# Patient Record
Sex: MALE | Race: WHITE | Employment: FULL TIME | URBAN - METROPOLITAN AREA
[De-identification: names, ages, dates, MRNs, and addresses within clinical notes are randomized per-mention and may not be internally consistent; named-entity substitution may affect disease eponyms.]

---

## 2017-02-22 ENCOUNTER — TELEPHONE (OUTPATIENT)
Dept: SURGERY | Facility: CLINIC | Age: 22
End: 2017-02-22

## 2017-03-22 ENCOUNTER — OFFICE VISIT (OUTPATIENT)
Dept: SURGERY | Facility: CLINIC | Age: 22
End: 2017-03-22
Attending: NURSE PRACTITIONER
Payer: COMMERCIAL

## 2017-03-22 VITALS
HEART RATE: 89 BPM | WEIGHT: 227.07 LBS | HEIGHT: 70 IN | SYSTOLIC BLOOD PRESSURE: 124 MMHG | BODY MASS INDEX: 32.51 KG/M2 | DIASTOLIC BLOOD PRESSURE: 64 MMHG

## 2017-03-22 DIAGNOSIS — K59.00 CONSTIPATION, UNSPECIFIED CONSTIPATION TYPE: Primary | ICD-10-CM

## 2017-03-22 DIAGNOSIS — K94.19 ALTERED BOWEL ELIMINATION DUE TO INTESTINAL OSTOMY (H): ICD-10-CM

## 2017-03-22 PROCEDURE — 99212 OFFICE O/P EST SF 10 MIN: CPT | Mod: ZF

## 2017-03-22 ASSESSMENT — PAIN SCALES - GENERAL: PAINLEVEL: NO PAIN (0)

## 2017-03-22 NOTE — NURSING NOTE
"Chief Complaint   Patient presents with     RECHECK     tube change       Initial /64 (BP Location: Right arm, Patient Position: Chair, Cuff Size: Adult Large)  Pulse 89  Ht 5' 10.08\" (178 cm)  Wt 227 lb 1.2 oz (103 kg)  BMI 32.51 kg/m2 Estimated body mass index is 32.51 kg/(m^2) as calculated from the following:    Height as of this encounter: 5' 10.08\" (178 cm).    Weight as of this encounter: 227 lb 1.2 oz (103 kg).  Medication Reconciliation: complete     Panchito Stephens LPN      "

## 2017-03-22 NOTE — PROGRESS NOTES
2017              Jacob English MD     [facility]   [address] [address]       RE: Abram Fay     MRN: [id number]     : 1995        Dear Dr. English:      It was a pleasure to see your patient, Ramses Fay, at the Pediatric Surgery Clinic at the [facility] today.  As you recall, Ramses is a 21-year-old male with a diagnosis of autism who also had severe chronic constipation that led to overflow incontinence.  He had an appendicostomy created by Dr. Jimbo Panchal here at the [place] several years ago for administration of a daily antegrade colonic enema or ACE.  Today, he is seen in clinic accompanied by his mom for a 1-year annual followup for bowel management.      On review with Ramses and his mom, Ramses reports that he gives himself daily antegrade colonic enema on most days.  However, he does work some evening shifts, and some evenings, he finds it is just too late when he gets home from work to administer the enema.  He usually uses 500 mL of GoLYTELY for his daily antegrade colonic enema, except on days when he has skipped the evening before he will sometimes give himself about 700 mL of GoLYTELY.  This does produce a large bowel movement and he is continent in between.  He again this year is asking about whether or not he will need this tube for the rest of his life and we talked again about switching to an oral laxative and trying to wean off of the antegrade colonic enema.      On exam, Ramses has an obese abdomen.  It is soft and nontender.  His current appendicostomy tube which is an AMT Mini ONE G-tube button was removed and a new one 14-Greenlandic by 6.5 cm was placed.  Four mL of water was placed in the retention balloon.  I did talk to Ramses again about trying a daily dose of Ex-Lax and taking it in the morning and then trying to have a bowel movement in the evening before he does his ACE.  If he  does have a bowel movement, then he can skip his ACE for the evening. We will obtain a contrast enema to see what the size of his colon is and if that has come down to a more normal size after his use of appendicostomy for several years now.      Thank you, Dr. English, for allowing us to participate in the care of your patient.  It was a pleasure to see he and his mom in clinic today.  Please do not hesitate to contact our office if you have any questions regarding his ongoing bowel management.      Sincerely,      TAMY Rae, CNP   Pediatric Surgery   [facility]       Total time of visit was 25 minutes over half in counseling and education

## 2017-03-22 NOTE — MR AVS SNAPSHOT
After Visit Summary   3/22/2017    Abram Fay    MRN: [id number]           Patient Information     Date Of Birth          1995        Visit Information        Provider Department      3/22/2017 11:00 AM Sofya Morales APRN CNP Peds Surgery        Today's Diagnoses     Constipation, unspecified constipation type    -  1       Follow-ups after your visit        Your next 10 appointments already scheduled     Mar 29, 2017  2:00 PM CDT   XR COLON WATER SOLUBLE with URXR1   [facility] [place],  Radiology ([facility])    [address] [place] [address] [phone]           Your exam will take about one hour. If you think you might be pregnant, tell your doctor before your exam.  Your bowel (insides) must be empty to get a clear picture. Follow these guidelines:   The night before your exam:Take 10 ounces magnesium citrate at 6 p.m. the night before your exam.   Take 3 Dulcolax tablets at 8 p.m. Swallow the tablets whole, do not chew or crush them. Do not take within 1 hour of antacids.   1 Dulcolax rectal suppository. Take this the morning of your exam if your stools are not clear by this time. If your stools are clear, you don t need to take this. Do not use a suppository if you are having an air contrast colon.   Follow a  non-residue diet  for at least 48 hours. This means no fruits, vegetables, nuts, seeds or whole grains.   Do not eat, chew gum or smoke for 8 hours before your exam.   Keep drinking clear liquids until 2 hours before your exam. Clear liquids include water, clear juice, black coffee or clear tea without milk, Gatorade, clear soda.   Please bring a list of your current medicines to your exam. (Include vitamins, minerals and over-the-counter medicines.) Leave your valuables at home.  Please call the Imaging Department at your exam site with any questions.              Future tests that were ordered for you today  "    Open Future Orders        Priority Expected Expires Ordered    X-ray Colon water soluble Routine 3/22/2017 3/22/2018 3/22/2017            Who to contact     Please call your clinic at [phone] to:    Ask questions about your health    Make or cancel appointments    Discuss your medicines    Learn about your test results    Speak to your doctor   If you have compliments or concerns about an experience at your clinic, or if you wish to file a complaint, please contact [facility] Patient Relations at [phone] or email us at [email or web address]         Additional Information About Your Visit        [email or web address] Information     [employer] is an electronic gateway that provides easy, online access to your medical records. With Cerecor, you can request a clinic appointment, read your test results, renew a prescription or communicate with your care team.     To sign up for [employer] visit the website at [email or web address]   You will be asked to enter the access code listed below, as well as some personal information. Please follow the directions to create your username and password.     Your access code is: [email or web address]  Expires: 2017 12:05 PM     Your access code will  in 90 days. If you need help or a new code, please contact your [facility] or call [phone] for assistance.        Care EveryWhere ID     This is your Care EveryWhere ID. This could be used by other organizations to access your [facility] medical records  [id number]        Your Vitals Were     Pulse Height BMI (Body Mass Index)             89 5' 10.08\" (178 cm) 32.51 kg/m2          Blood Pressure from Last 3 Encounters:   17 124/64   16 108/62   11/11/15 107/73    Weight from Last 3 Encounters:   17 227 lb 1.2 oz (103 kg)   16 227 lb 6.4 oz (103.1 kg)   11/11/15 244 lb 11.4 oz (111 kg)               Primary Care Provider Office Phone # Fax " #    Jacob Engilsh [phone]       [facility] [address] [facility] [address]        Thank you!     Thank you for choosing PEDS SURGERY  for your care. Our goal is always to provide you with excellent care. Hearing back from our patients is one way we can continue to improve our services. Please take a few minutes to complete the written survey that you may receive in the mail after your visit with us. Thank you!             Your Updated Medication List - Protect others around you: Learn how to safely use, store and throw away your medicines at www.disposemymeds.org.          This list is accurate as of: 3/22/17 12:05 PM.  Always use your most recent med list.                   Brand Name Dispense Instructions for use    BENADRYL 25 MG tablet   Generic drug:  diphenhydrAMINE      2 tab at HS       GOLYTELY PO      Reported on 3/22/2017       melatonin 5 MG Caps      1 tab at HS       mupirocin 2 % ointment    BACTROBAN    22 g    Apply topically 3 times daily After foot soaks       order for DME     1 Device    AMT mini-one gastrostomy tube buttons

## 2017-03-22 NOTE — LETTER
3/22/2017      RE: Abram Fay  [address]  [facility] [address]       2017              Jacob English MD     [facility]   [address] [address]       RE: Abram aFy     MRN: [id number]     : 1995        Dear Dr. English:      It was a pleasure to see your patient, Ramses Fay, at the Pediatric Surgery Clinic at the [facility] today.  As you recall, Ramses is a 21-year-old male with a diagnosis of autism who also had severe chronic constipation that led to overflow incontinence.  He had an appendicostomy created by Dr. Jimbo Panchal here at the [facility] several years ago for administration of a daily antegrade colonic enema or ACE.  Today, he is seen in clinic accompanied by his mom for a 1-year annual followup for bowel management.      On review with Ramses and his mom, Ramses reports that he gives himself daily antegrade colonic enema on most days.  However, he does work some evening shifts, and some evenings, he finds it is just too late when he gets home from work to administer the enema.  He usually uses 500 mL of GoLYTELY for his daily antegrade colonic enema, except on days when he has skipped the evening before he will sometimes give himself about 700 mL of GoLYTELY.  This does produce a large bowel movement and he is continent in between.  He again this year is asking about whether or not he will need this tube for the rest of his life and we talked again about switching to an oral laxative and trying to wean off of the antegrade colonic enema.      On exam, Ramses has an obese abdomen.  It is soft and nontender.  His current appendicostomy tube which is an AMT Mini ONE G-tube button was removed and a new one 14-Danish by 6.5 cm was placed.  Four mL of water was placed in the retention balloon.  I did talk to Ramses again about trying a daily dose of Ex-Lax and taking it in the  morning and then trying to have a bowel movement in the evening before he does his ACE.  If he does have a bowel movement, then he can skip his ACE for the evening. We will obtain a contrast enema to see what the size of his colon is and if that has come down to a more normal size after his use of appendicostomy for several years now.      Thank you, Dr. English, for allowing us to participate in the care of your patient.  It was a pleasure to see he and his mom in clinic today.  Please do not hesitate to contact our office if you have any questions regarding his ongoing bowel management.      Sincerely,      TAMY Rae, CNP   Pediatric Surgery   [facility]       Total time of visit was 25 minutes over half in counseling and education

## 2017-03-29 ENCOUNTER — HOSPITAL ENCOUNTER (OUTPATIENT)
Dept: GENERAL RADIOLOGY | Facility: CLINIC | Age: 22
Discharge: HOME | End: 2017-03-29
Attending: NURSE PRACTITIONER | Admitting: NURSE PRACTITIONER
Payer: COMMERCIAL

## 2017-03-29 DIAGNOSIS — K59.00 CONSTIPATION, UNSPECIFIED CONSTIPATION TYPE: ICD-10-CM

## 2017-03-29 PROCEDURE — 74283 THER NMA RDCTJ INTUS/OBSTRCJ: CPT

## 2017-03-29 PROCEDURE — 25500064 ZZH RX 255 OP 636: Performed by: NURSE PRACTITIONER

## 2017-03-29 RX ADMIN — DIATRIZOATE MEGLUMINE 1450 ML: 180 INJECTION, SOLUTION INTRAVESICAL at 14:56

## 2017-03-30 ENCOUNTER — TELEPHONE (OUTPATIENT)
Dept: SURGERY | Facility: CLINIC | Age: 22
End: 2017-03-30

## 2017-03-30 NOTE — TELEPHONE ENCOUNTER
PEDS SURGERY  [facility]  [address]  [address]  [facility] [address]  [phone]  Dept: [phone]  __________________________________________________________________________________  Patient: Abram Pearce Nya     : 1995  Encounter: 3/30/17    MRN: [id number]    Commmunication  Abram Pearsonomids  MRN:  [id number]  :  1995  Telephone Number Contacted: [phone] (home)  Ordering Physician: blake  Date of Communication:  3/30/2017  Time of Communication:  9:16 AM  Communication Method: Voice Message  Person receiving communication:  Parent  Follow up instructions: call with questions  left VM message    Results: good progress, continue with Daily ACE via appendicostomy, continue oral senna if attempting to start weaning off of ACE

## 2017-04-10 ENCOUNTER — TELEPHONE (OUTPATIENT)
Dept: SURGERY | Facility: CLINIC | Age: 22
End: 2017-04-10

## 2017-04-10 NOTE — TELEPHONE ENCOUNTER
PEDS SURGERY  [facility]  [address]  [address]  [facility] [address]  [phone]  Dept: [phone]  __________________________________________________________________________________  Patient: Abram Pearce Rigos     : 1995  Encounter: 4/10/17    MRN: [id number]    Commmunication  Abram Fay  MRN:  [id number]  :  1995  Telephone Number Contacted: [phone] (home)  Ordering Physician: blake  Date of Communication:  4/10/2017  Time of Communication:  9:48 AM  Communication Method: Phone  Person receiving communication:  mother  Follow up instructions: start 1 square of ex lax daily. If stools on own can skip ACE, if no spontaneous bowel movement do usual ACE.   Patient understood results and recommendations.     Results: improved size of colon

## 2017-11-18 ENCOUNTER — OFFICE VISIT (OUTPATIENT)
Dept: URGENT CARE | Facility: URGENT CARE | Age: 22
End: 2017-11-18
Payer: COMMERCIAL

## 2017-11-18 VITALS
HEART RATE: 80 BPM | BODY MASS INDEX: 35.36 KG/M2 | DIASTOLIC BLOOD PRESSURE: 84 MMHG | SYSTOLIC BLOOD PRESSURE: 124 MMHG | WEIGHT: 247 LBS | RESPIRATION RATE: 20 BRPM | TEMPERATURE: 98.7 F

## 2017-11-18 DIAGNOSIS — J06.9 VIRAL UPPER RESPIRATORY TRACT INFECTION: Primary | ICD-10-CM

## 2017-11-18 DIAGNOSIS — H65.192 ACUTE MUCOID OTITIS MEDIA OF LEFT EAR: ICD-10-CM

## 2017-11-18 PROCEDURE — 99213 OFFICE O/P EST LOW 20 MIN: CPT | Performed by: PHYSICIAN ASSISTANT

## 2017-11-18 RX ORDER — AMOXICILLIN 500 MG/1
500 CAPSULE ORAL 3 TIMES DAILY
Qty: 30 CAPSULE | Refills: 0 | Status: SHIPPED | OUTPATIENT
Start: 2017-11-18 | End: 2019-05-23

## 2017-11-18 RX ORDER — PSEUDOEPHEDRINE HCL 120 MG/1
120 TABLET, FILM COATED, EXTENDED RELEASE ORAL EVERY 12 HOURS
Qty: 20 TABLET | Refills: 0 | Status: SHIPPED | OUTPATIENT
Start: 2017-11-18 | End: 2019-07-11

## 2017-11-18 NOTE — NURSING NOTE
"Chief Complaint   Patient presents with     Ear Problem     lt ear pain today,has had cold sx       Initial /84 (BP Location: Right arm, Patient Position: Chair, Cuff Size: Adult Regular)  Pulse 80  Temp 98.7  F (37.1  C) (Oral)  Resp 20  Wt 247 lb (112 kg)  BMI 35.36 kg/m2 Estimated body mass index is 35.36 kg/(m^2) as calculated from the following:    Height as of 3/22/17: 5' 10.08\" (1.78 m).    Weight as of this encounter: 247 lb (112 kg).  Medication Reconciliation: complete   Elvira MITCHELL    "

## 2017-11-18 NOTE — PROGRESS NOTES
SUBJECTIVE:   Abram Fay is a 22 year old male presenting with a chief complaint of ear pain left.   Onset of symptoms was 3 day(s) ago.  Course of illness is worsening.    Severity moderate  Current and Associated symptoms: cough - non-productive and cough - productive  Treatment measures tried include Tylenol/Ibuprofen, Decongestants and OTC Cough med.  Predisposing factors include ear infections as a child.    Past Medical History:   Diagnosis Date     Autistic disorder, current or active state      Constipation      Encopresis(307.7)      Muscle tone, decreased      Current Outpatient Prescriptions   Medication Sig Dispense Refill     polyethylene glycol (GOLYTELY) 236 G suspension Administer 500-700 ml via appendicostomy daily 51369 mL 11     BENADRYL 25 MG OR TABS 2 tab at HS       mupirocin (BACTROBAN) 2 % ointment Apply topically 3 times daily After foot soaks (Patient not taking: Reported on 3/22/2017) 22 g 1     ORDER FOR DME AMT mini-one gastrostomy tube buttons (Patient not taking: Reported on 3/22/2017) 1 Device 2     PEG 3350-KCl-NaBcb-NaCl-NaSulf (GOLYTELY PO) Reported on 3/22/2017       MELATONIN 5 MG OR CAPS 1 tab at HS       Social History   Substance Use Topics     Smoking status: Never Smoker     Smokeless tobacco: Never Used     Alcohol use No       ROS:  CONSTITUTIONAL:NEGATIVE for fever, chills, change in weight  RESP:cough-non productive and cough-productive    OBJECTIVE:  /84 (BP Location: Right arm, Patient Position: Chair, Cuff Size: Adult Regular)  Pulse 80  Temp 98.7  F (37.1  C) (Oral)  Resp 20  Wt 247 lb (112 kg)  BMI 35.36 kg/m2  GENERAL APPEARANCE: healthy, alert and no distress  EYES: EOMI,  PERRL, conjunctiva clear  HENT: TM erythematous left  NECK: supple, nontender, no lymphadenopathy  RESP: lungs clear to auscultation - no rales, rhonchi or wheezes  CV: regular rates and rhythm, normal S1 S2, no murmur noted  ABDOMEN:  soft, nontender, no HSM or masses and  bowel sounds normal  NEURO: Normal strength and tone, sensory exam grossly normal,  normal speech and mentation  SKIN: no suspicious lesions or rashes    ASSESSMENT:    1. Viral upper respiratory tract infection    - pseudoePHEDrine (SUDAFED) 120 MG 12 hr tablet; Take 1 tablet (120 mg) by mouth every 12 hours  Dispense: 20 tablet; Refill: 0    2. Acute mucoid otitis media of left ear    - pseudoePHEDrine (SUDAFED) 120 MG 12 hr tablet; Take 1 tablet (120 mg) by mouth every 12 hours  Dispense: 20 tablet; Refill: 0  - amoxicillin (AMOXIL) 500 MG capsule; Take 1 capsule (500 mg) by mouth 3 times daily  Dispense: 30 capsule; Refill: 0    PLAN:  OTC cough suppressant/expectorant and OTC decongestant/antihistamine. Follow up in primary clinic if not improving over the next 3 days.   See orders in Epic

## 2017-11-18 NOTE — LETTER
[facility]  [address] [address]  Phone: [phone]    November 18, 2017        Abram Fay  [address]  [facility] [address]          To whom it may concern:    RE: Abram Fay    Patient was seen and treated today at our clinic.    Please contact me for questions or concerns.      Sincerely,        Prashanth Jackman PA-C

## 2017-11-18 NOTE — MR AVS SNAPSHOT
"              After Visit Summary   2017    Abram Fay    MRN: [id number]           Patient Information     Date Of Birth          1995        Visit Information        Provider Department      2017 4:20 PM Prashanth Jackman PA-C [facility]        Today's Diagnoses     Viral upper respiratory tract infection    -  1    Acute mucoid otitis media of left ear           Follow-ups after your visit        Who to contact     If you have questions or need follow up information about today's clinic visit or your schedule please contact [facility] directly at [phone].  Normal or non-critical lab and imaging results will be communicated to you by STAT-Diagnosticahart, letter or phone within 4 business days after the clinic has received the results. If you do not hear from us within 7 days, please contact the clinic through STAT-Diagnosticahart or phone. If you have a critical or abnormal lab result, we will notify you by phone as soon as possible.  Submit refill requests through [employer] or call your pharmacy and they will forward the refill request to us. Please allow 3 business days for your refill to be completed.          Additional Information About Your Visit        MyChart Information     [employer] lets you send messages to your doctor, view your test results, renew your prescriptions, schedule appointments and more. To sign up, go to [email or web address] . Click on \"Log in\" on the left side of the screen, which will take you to the Welcome page. Then click on \"Sign up Now\" on the right side of the page.     You will be asked to enter the access code listed below, as well as some personal information. Please follow the directions to create your username and password.     Your access code is: [email or web address]  Expires: 2018  4:59 PM     Your access code will  in 90 days. If you need help or a new code, please call your [facility] clinic or [phone].   "      Care EveryWhere ID     This is your Care EveryWhere ID. This could be used by other organizations to access your [facility] medical records  [id number]        Your Vitals Were     Pulse Temperature Respirations BMI (Body Mass Index)          80 98.7  F (37.1  C) (Oral) 20 35.36 kg/m2         Blood Pressure from Last 3 Encounters:   11/18/17 124/84   03/22/17 124/64   12/12/16 108/62    Weight from Last 3 Encounters:   11/18/17 247 lb (112 kg)   03/22/17 227 lb 1.2 oz (103 kg)   12/12/16 227 lb 6.4 oz (103.1 kg)              Today, you had the following     No orders found for display         Today's Medication Changes          These changes are accurate as of: 11/18/17  4:59 PM.  If you have any questions, ask your nurse or doctor.               Start taking these medicines.        Dose/Directions    amoxicillin 500 MG capsule   Commonly known as:  AMOXIL   Used for:  Acute mucoid otitis media of left ear   Started by:  Prashanth Jackman PA-C        Dose:  500 mg   Take 1 capsule (500 mg) by mouth 3 times daily   Quantity:  30 capsule   Refills:  0       pseudoePHEDrine 120 MG 12 hr tablet   Commonly known as:  SUDAFED   Used for:  Viral upper respiratory tract infection, Acute mucoid otitis media of left ear   Started by:  Prashanth Jackman PA-C        Dose:  120 mg   Take 1 tablet (120 mg) by mouth every 12 hours   Quantity:  20 tablet   Refills:  0            Where to get your medicines      These medications were sent to [facility] [address] [address]     Phone:  [phone]     amoxicillin 500 MG capsule         Some of these will need a paper prescription and others can be bought over the counter.  Ask your nurse if you have questions.     Bring a paper prescription for each of these medications     pseudoePHEDrine 120 MG 12 hr tablet                Primary Care Provider Office Phone # Fax #    Jacob English [phone]  [phone]       [address] [facility] [address]        Equal Access to Services     NIKOS HAYES : Hadii aad ku hadanishao Sorachelali, waaxda luqadaha, qaybta kaalmada adethelma, jann ramos laHavenverito brady. So [facility] [phone].    ATENCIÓN: Si habla español, tiene a hernández disposición servicios gratuitos de asistencia lingüística. Llame al [phone].    We comply with applicable federal civil rights laws and [place] laws. We do not discriminate on the basis of race, color, national origin, age, disability, sex, sexual orientation, or gender identity.            Thank you!     Thank you for choosing [facility]  for your care. Our goal is always to provide you with excellent care. Hearing back from our patients is one way we can continue to improve our services. Please take a few minutes to complete the written survey that you may receive in the mail after your visit with us. Thank you!             Your Updated Medication List - Protect others around you: Learn how to safely use, store and throw away your medicines at www.disposemymeds.org.          This list is accurate as of: 11/18/17  4:59 PM.  Always use your most recent med list.                   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)       melatonin 5 MG Caps      1 tab at HS        mupirocin 2 % ointment    BACTROBAN    22 g    Apply topically 3 times daily After foot soaks    Ingrowing nail, left great toe, Ingrowing nail, right great toe       order for  DME     1 Device    AMT mini-one gastrostomy tube buttons    Constipation       pseudoePHEDrine 120 MG 12 hr tablet    SUDAFED    20 tablet    Take 1 tablet (120 mg) by mouth every 12 hours    Viral upper respiratory tract infection, Acute mucoid otitis media of left ear       * Notice:  This list has 2 medication(s) that are the same as other medications prescribed for you. Read the directions carefully, and ask your doctor or other care provider to review them with you.

## 2018-09-27 ENCOUNTER — HOSPITAL ENCOUNTER (OUTPATIENT)
Dept: GENERAL RADIOLOGY | Facility: CLINIC | Age: 23
Discharge: HOME | End: 2018-09-27
Attending: NURSE PRACTITIONER | Admitting: NURSE PRACTITIONER
Payer: COMMERCIAL

## 2018-09-27 ENCOUNTER — OFFICE VISIT (OUTPATIENT)
Dept: SURGERY | Facility: CLINIC | Age: 23
End: 2018-09-27
Attending: NURSE PRACTITIONER
Payer: COMMERCIAL

## 2018-09-27 VITALS
BODY MASS INDEX: 35.38 KG/M2 | HEART RATE: 91 BPM | WEIGHT: 247.14 LBS | HEIGHT: 70 IN | DIASTOLIC BLOOD PRESSURE: 70 MMHG | SYSTOLIC BLOOD PRESSURE: 127 MMHG

## 2018-09-27 DIAGNOSIS — K94.19 ALTERED BOWEL ELIMINATION DUE TO INTESTINAL OSTOMY (H): ICD-10-CM

## 2018-09-27 DIAGNOSIS — K59.00 CONSTIPATION, UNSPECIFIED CONSTIPATION TYPE: ICD-10-CM

## 2018-09-27 DIAGNOSIS — K59.00 CONSTIPATION, UNSPECIFIED CONSTIPATION TYPE: Primary | ICD-10-CM

## 2018-09-27 PROCEDURE — 74018 RADEX ABDOMEN 1 VIEW: CPT

## 2018-09-27 PROCEDURE — G0463 HOSPITAL OUTPT CLINIC VISIT: HCPCS | Mod: ZF

## 2018-09-27 ASSESSMENT — PAIN SCALES - GENERAL: PAINLEVEL: NO PAIN (0)

## 2018-09-27 NOTE — LETTER
2018      RE: Abram Fay  [address] [place] [address]       2018         Jacob English MD   [address] [place] [address]       PATIENT:   Abram Fay   :  1995   MRN #:  [id number]       Dear Dr. English:      It was a pleasure to see your patient, Abram Fay, in the Pediatric Surgery Clinic at the [facility] today.  As you recall, Ramses is a now 23-year-old male with a history of autism and severe chronic constipation that was nonresponsive to medical therapy.  He underwent an appendicostomy for administration of antegrade colonic enemas several years ago and has been doing enemas since that time.      Ramses is seen in clinic today accompanied by his mom for a routine annual followup.      On review with Ramses and his mother, he is still using his appendicostomy to administer antegrade colonic enemas.  He states he does this almost every day, it kind of depends on his work schedule.  The most he goes without using it is 3 days.  When he does use it to give an antegrade enema, he uses 600-700 mL of GoLYTELY.  He reports that this takes about 6-7 minutes to infuse and then he almost immediately has a bowel movement.  In addition to the antegrade colonic enemas, he is taking Dulcolax stool softener 2 tablets every day and Ex-Lax 1 square per day.  Sometimes if he feels like he is becoming constipated, he will take 2 Ex-Lax squares a day.  He also states that every once in a while he takes a fiber gummy.      Ramses states that there are days when he has a bowel movement without doing his antegrade colonic enema and he is wondering how he can work to wean himself off of his enemas.      I did discuss a regimen with Ramses to try and wean off his ACE.  I have instructed him to take 2 Dulcolax tablets a day and 2 Ex-Lax squares daily.  He should do this every day and then he does not need to do a flush  unless he gets to a third day without having a bowel movement.  At that time, he should do an antegrade colonic enema of 1000 mL of GoLYTELY.  I did send Ramses for an x-ray today so we can just assess where he is and he did have quite a bit of stool in his ascending colon.  Before we embark on the every third day appendicostomy/I will have him do 3 days in a row of 1 liter of GoLYTELY to see if we can get him completely cleaned out before we try to decrease the frequency of his ACE.      On exam, Ramses has an obese abdomen that is soft and nontender.  He has a 14 Khmer 6.5 cm AMT Mini ONE button in place that was removed and a new one was placed and 4 mL of saline was placed in the retention balloon.  I did discuss with Ramses trying to increase his activity to possibly lose some weight or at least not gain any more weight as we are reaching the maximum length of tubes that we have available.  If this tube does become too short for him, we have a couple of other options.  One is to use a straight KYRA-Key G-tube that can accommodate up to a 10 cm tract.  The other option is to do a daily intermittent catheterization of the stoma; however, Ramses did not appear to be too interested in that.      Thank you, Dr. English, for allowing us to participate in the care of your patient, Ramses.  It was a pleasure to see him and his mom in clinic today.  Please do not hesitate to contact our office at [phone] if you have any questions regarding the ongoing management of his appendicostomy and antegrade colonic enemas.      Sincerely,             TAMY Rae, CNP   Pediatric Surgery   [facility]       Total time of visit was 15 minutes, greater than 50% in counseling and education      TAMY RUSH CNP

## 2018-09-27 NOTE — PROGRESS NOTES
2018         Jacob English MD   [address] [address]       PATIENT:   Abram Fay   :  1995   MRN #:  [id number]       Dear Dr. English:      It was a pleasure to see your patient, Abram Fay, in the Pediatric Surgery Clinic at the [facility] today.  As you recall, Ramses is a now 23-year-old male with a history of autism and severe chronic constipation that was nonresponsive to medical therapy.  He underwent an appendicostomy for administration of antegrade colonic enemas several years ago and has been doing enemas since that time.      Ramses is seen in clinic today accompanied by his mom for a routine annual followup.      On review with Ramses and his mother, he is still using his appendicostomy to administer antegrade colonic enemas.  He states he does this almost every day, it kind of depends on his work schedule.  The most he goes without using it is 3 days.  When he does use it to give an antegrade enema, he uses 600-700 mL of GoLYTELY.  He reports that this takes about 6-7 minutes to infuse and then he almost immediately has a bowel movement.  In addition to the antegrade colonic enemas, he is taking Dulcolax stool softener 2 tablets every day and Ex-Lax 1 square per day.  Sometimes if he feels like he is becoming constipated, he will take 2 Ex-Lax squares a day.  He also states that every once in a while he takes a fiber gummy.      Ramses states that there are days when he has a bowel movement without doing his antegrade colonic enema and he is wondering how he can work to wean himself off of his enemas.      I did discuss a regimen with Ramses to try and wean off his ACE.  I have instructed him to take 2 Dulcolax tablets a day and 2 Ex-Lax squares daily.  He should do this every day and then he does not need to do a flush unless he gets to a third day without having a bowel movement.  At that time, he should  do an antegrade colonic enema of 1000 mL of GoLYTELY.  I did send Ramses for an x-ray today so we can just assess where he is and he did have quite a bit of stool in his ascending colon.  Before we embark on the every third day appendicostomy/I will have him do 3 days in a row of 1 liter of GoLYTELY to see if we can get him completely cleaned out before we try to decrease the frequency of his ACE.      On exam, Ramses has an obese abdomen that is soft and nontender.  He has a 14 [place] 6.5 cm AMT Mini ONE button in place that was removed and a new one was placed and 4 mL of saline was placed in the retention balloon.  I did discuss with Ramses trying to increase his activity to possibly lose some weight or at least not gain any more weight as we are reaching the maximum length of tubes that we have available.  If this tube does become too short for him, we have a couple of other options.  One is to use a straight KYRA-Key G-tube that can accommodate up to a 10 cm tract.  The other option is to do a daily intermittent catheterization of the stoma; however, Ramses did not appear to be too interested in that.      Thank you, Dr. English, for allowing us to participate in the care of your patient, Ramses.  It was a pleasure to see him and his mom in clinic today.  Please do not hesitate to contact our office at [phone] if you have any questions regarding the ongoing management of his appendicostomy and antegrade colonic enemas.      Sincerely,             TAMY Rae, CNP   Pediatric Surgery   [facility]       Total time of visit was 15 minutes, greater than 50% in counseling and education

## 2018-09-27 NOTE — PATIENT INSTRUCTIONS
Take 2 Dulcolax tablets per day.  Take 2 Ex lax squares per day.   If you have not had a bowel movement in 3 days, give yourself an ACE flush of 1000 ml Golytely.

## 2018-09-27 NOTE — MR AVS SNAPSHOT
After Visit Summary   2018    Abram Fay    MRN: [id number]           Patient Information     Date Of Birth          1995        Visit Information        Provider Department      2018 1:00 PM Sofya Morales APRN CNP Peds Surgery        Today's Diagnoses     Constipation, unspecified constipation type    -  1      Care Instructions    Take 2 Dulcolax tablets per day.  Take 2 Ex lax squares per day.   If you have not had a bowel movement in 3 days, give yourself an ACE flush of 1000 ml Golytely.           Follow-ups after your visit        Future tests that were ordered for you today     Open Future Orders        Priority Expected Expires Ordered    X-ray Abdomen 1 vw Routine 2018            Who to contact     Please call your clinic at [phone] to:    Ask questions about your health    Make or cancel appointments    Discuss your medicines    Learn about your test results    Speak to your doctor            Additional Information About Your Visit        MyChart Information     [employer] is an electronic gateway that provides easy, online access to your medical records. With [employer], you can request a clinic appointment, read your test results, renew a prescription or communicate with your care team.     To sign up for [employer] visit the website at [email or web address]   You will be asked to enter the access code listed below, as well as some personal information. Please follow the directions to create your username and password.     Your access code is: [email or web address]  Expires: 2018  1:31 PM     Your access code will  in 90 days. If you need help or a new code, please contact your [facility] or call [phone] for assistance.        Care EveryWhere ID     This is your Care EveryWhere ID. This could be used by other organizations to access your [place] medical records  [id number]        Your Vitals  "Were     Pulse Height BMI (Body Mass Index)             91 5' 10.28\" (178.5 cm) 35.18 kg/m2          Blood Pressure from Last 3 Encounters:   09/27/18 127/70   11/18/17 124/84   03/22/17 124/64    Weight from Last 3 Encounters:   09/27/18 247 lb 2.2 oz (112.1 kg)   11/18/17 247 lb (112 kg)   03/22/17 227 lb 1.2 oz (103 kg)               Primary Care Provider Office Phone # Fax #    Jacob English [phone]       [address] [facility] [address]        Equal Access to Services     NIKOS HAYES : Segundo Rolon, jim fernandez, roly chingalmada kade, jann brady. So [facility] [place] [facility] [phone].    ATENCIÓN: Si habla español, tiene a hernández disposición servicios gratuitos de asistencia lingüística. Llame al [phone].    We comply with applicable federal civil rights laws and [place] laws. We do not discriminate on the basis of race, color, national origin, age, disability, sex, sexual orientation, or gender identity.            Thank you!     Thank you for choosing PEDS SURGERY  for your care. Our goal is always to provide you with excellent care. Hearing back from our patients is one way we can continue to improve our services. Please take a few minutes to complete the written survey that you may receive in the mail after your visit with us. Thank you!             Your Updated Medication List - Protect others around you: Learn how to safely use, store and throw away your medicines at www.disposemymeds.org.          This list is accurate as of 9/27/18  1:31 PM.  Always use your most recent med list.                   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)       melatonin 5 MG Caps      1 tab at HS        mupirocin 2 % ointment    BACTROBAN    22 g    Apply topically 3 times daily After foot soaks    Ingrowing nail, left great toe, Ingrowing nail, right great toe       order for DME     1 Device    AMT mini-one gastrostomy tube buttons    Constipation       pseudoePHEDrine 120 MG 12 hr tablet    SUDAFED    20 tablet    Take 1 tablet (120 mg) by mouth every 12 hours    Viral upper respiratory tract infection, Acute mucoid otitis media of left ear       * Notice:  This list has 2 medication(s) that are the same as other medications prescribed for you. Read the directions carefully, and ask your doctor or other care provider to review them with you.

## 2018-09-27 NOTE — NURSING NOTE
"[facility] [[id number]]  Chief Complaint   Patient presents with     RECHECK     appendicostomy tube change     Initial /70  Pulse 91  Ht 5' 10.28\" (178.5 cm)  Wt 247 lb 2.2 oz (112.1 kg)  BMI 35.18 kg/m2 Estimated body mass index is 35.18 kg/(m^2) as calculated from the following:    Height as of this encounter: 5' 10.28\" (178.5 cm).    Weight as of this encounter: 247 lb 2.2 oz (112.1 kg).  Medication Reconciliation: complete     Charlotte Antunez      "

## 2019-04-25 ENCOUNTER — OFFICE VISIT (OUTPATIENT)
Dept: SURGERY | Facility: CLINIC | Age: 24
End: 2019-04-25
Attending: NURSE PRACTITIONER
Payer: COMMERCIAL

## 2019-04-25 VITALS — WEIGHT: 243.17 LBS | HEIGHT: 70 IN | BODY MASS INDEX: 34.81 KG/M2

## 2019-04-25 DIAGNOSIS — B37.2 CANDIDAL DERMATITIS: Primary | ICD-10-CM

## 2019-04-25 PROCEDURE — G0463 HOSPITAL OUTPT CLINIC VISIT: HCPCS | Mod: ZF

## 2019-04-25 RX ORDER — NYSTATIN 100000 U/G
OINTMENT TOPICAL 2 TIMES DAILY
Qty: 15 G | Refills: 0 | Status: SHIPPED | OUTPATIENT
Start: 2019-04-25 | End: 2021-02-24

## 2019-04-25 ASSESSMENT — PAIN SCALES - GENERAL: PAINLEVEL: NO PAIN (0)

## 2019-04-25 ASSESSMENT — MIFFLIN-ST. JEOR: SCORE: 2105.5

## 2019-04-25 NOTE — LETTER
2019      RE: Abram Fay  [address]  [facility] [address]       2019      Magda Garza DO   [address] [place] [address]       RE: Abram Fay   MRN:  [id number]   : 1995      Dear Dr. Garza:       It was a pleasure to see your patient, Ramses Fay, at the Pediatric Surgery Clinic at the [facility].  As you recall, Ramses is a 24-year-old male with a history of autism spectrum disorder who suffered from severe constipation that was recalcitrant to medical therapy.  He, in the distant past now, underwent an appendicostomy placement for the administration of antegrade colonic enemas. Ramses is seen in clinic today accompanied by his mother for routine bowel management followup.      On review with Ramses, he is using his appendicostomy tube on an almost daily basis to administer flush consisting of GoLYTELY.  He reports that when he does this, he does have an immediate bowel movement afterwards.  He also continues to take 2 Ex-Lax chocolate squares each day.  He reports that on some days, not very often, he does have a spontaneous bowel movement before he does his appendicostomy flush and on those days he will skip his antegrade enema.  On abdominal exam, he has an appendicostomy tube in his umbilicus. It is an AMT MiniONE G-tube button. He has reported some leaking around the site and his belly button is red and irritated with satellite red dot lesions indicative of a yeast infection.      Ramses is doing very well on the current regimen.  We did discuss him being able to skip the ACE if he does have a spontaneous bowel movement.  I have talked to him in the past about potentially weaning off using the appendicostomy for flushes.  However, he does not seem ready to do that yet.  I did prescribe Nystatin ointment for his umbilicus and I did replace his appendicostomy tube with a new one today.  Hopefully, with a functioning balloon  to hold this tube in, the leakage at the site will stop and that yeast infection will clear up.       Thank you, Dr. Garza, for allowing us to participate in the care of your patient, Ramses.  It was a pleasure to see him and his mom in clinic today.  Please do not hesitate to contact our office at [phone] if you have any questions regarding the ongoing management of his appendicostomy or antegrade colonic enemas.      Sincerely,      TAMY Rae, CNP   Pediatric Surgery   [facility]

## 2019-04-25 NOTE — NURSING NOTE
"[facility] [[id number]]  Chief Complaint   Patient presents with     RECHECK     follow up tube change     Initial Ht 5' 10.39\" (178.8 cm)   Wt 243 lb 2.7 oz (110.3 kg)   BMI 34.50 kg/m   Estimated body mass index is 34.5 kg/m  as calculated from the following:    Height as of this encounter: 5' 10.39\" (178.8 cm).    Weight as of this encounter: 243 lb 2.7 oz (110.3 kg).  Medication Reconciliation: complete  "

## 2019-04-25 NOTE — PROGRESS NOTES
2019      Magda Garza DO   [address] [place] [address]       RE: Abram Fay   MRN:  [id number]   : 1995      Dear Dr. Garza:       It was a pleasure to see your patient, Ramses Fay, at the Pediatric Surgery Clinic at the [facility].  As you recall, Ramses is a 24-year-old male with a history of autism spectrum disorder who suffered from severe constipation that was recalcitrant to medical therapy.  He, in the distant past now, underwent an appendicostomy placement for the administration of antegrade colonic enemas. Ramses is seen in clinic today accompanied by his mother for routine bowel management followup.      On review with Ramses, he is using his appendicostomy tube on an almost daily basis to administer flush consisting of GoLYTELY.  He reports that when he does this, he does have an immediate bowel movement afterwards.  He also continues to take 2 Ex-Lax chocolate squares each day.  He reports that on some days, not very often, he does have a spontaneous bowel movement before he does his appendicostomy flush and on those days he will skip his antegrade enema.  On abdominal exam, he has an appendicostomy tube in his umbilicus. It is an AMT MiniONE G-tube button. He has reported some leaking around the site and his belly button is red and irritated with satellite red dot lesions indicative of a yeast infection.      Ramses is doing very well on the current regimen.  We did discuss him being able to skip the ACE if he does have a spontaneous bowel movement.  I have talked to him in the past about potentially weaning off using the appendicostomy for flushes.  However, he does not seem ready to do that yet.  I did prescribe Nystatin ointment for his umbilicus and I did replace his appendicostomy tube with a new one today.  Hopefully, with a functioning balloon to hold this tube in, the leakage at the site will stop and that yeast infection  will clear up.       Thank you, Dr. Garza, for allowing us to participate in the care of your patient, Ramses.  It was a pleasure to see him and his mom in clinic today.  Please do not hesitate to contact our office at [phone] if you have any questions regarding the ongoing management of his appendicostomy or antegrade colonic enemas.      Sincerely,      TAMY Rae, CNP   Pediatric Surgery   [facility]

## 2019-05-23 ENCOUNTER — OFFICE VISIT (OUTPATIENT)
Dept: ALLERGY | Facility: CLINIC | Age: 24
End: 2019-05-23
Payer: COMMERCIAL

## 2019-05-23 VITALS
TEMPERATURE: 97.6 F | HEART RATE: 84 BPM | BODY MASS INDEX: 34.62 KG/M2 | WEIGHT: 241.84 LBS | SYSTOLIC BLOOD PRESSURE: 129 MMHG | HEIGHT: 70 IN | OXYGEN SATURATION: 99 % | RESPIRATION RATE: 16 BRPM | DIASTOLIC BLOOD PRESSURE: 73 MMHG

## 2019-05-23 DIAGNOSIS — L85.8 KERATOSIS PILARIS: ICD-10-CM

## 2019-05-23 DIAGNOSIS — J30.0 CHRONIC VASOMOTOR RHINITIS: Primary | ICD-10-CM

## 2019-05-23 PROCEDURE — 99000 SPECIMEN HANDLING OFFICE-LAB: CPT | Performed by: ALLERGY & IMMUNOLOGY

## 2019-05-23 PROCEDURE — 86003 ALLG SPEC IGE CRUDE XTRC EA: CPT | Mod: 90 | Performed by: ALLERGY & IMMUNOLOGY

## 2019-05-23 PROCEDURE — 82785 ASSAY OF IGE: CPT | Performed by: ALLERGY & IMMUNOLOGY

## 2019-05-23 PROCEDURE — 36415 COLL VENOUS BLD VENIPUNCTURE: CPT | Performed by: ALLERGY & IMMUNOLOGY

## 2019-05-23 PROCEDURE — 99204 OFFICE O/P NEW MOD 45 MIN: CPT | Performed by: ALLERGY & IMMUNOLOGY

## 2019-05-23 PROCEDURE — 86003 ALLG SPEC IGE CRUDE XTRC EA: CPT | Mod: 59 | Performed by: ALLERGY & IMMUNOLOGY

## 2019-05-23 RX ORDER — CETIRIZINE HYDROCHLORIDE 10 MG/1
10 TABLET ORAL DAILY
COMMUNITY
End: 2021-01-22

## 2019-05-23 RX ORDER — FLUTICASONE PROPIONATE 50 MCG
2 SPRAY, SUSPENSION (ML) NASAL DAILY
Qty: 16 G | Refills: 3 | Status: SHIPPED | OUTPATIENT
Start: 2019-05-23 | End: 2022-12-08

## 2019-05-23 RX ORDER — FLUTICASONE PROPIONATE 50 MCG
1 SPRAY, SUSPENSION (ML) NASAL DAILY
COMMUNITY
End: 2019-12-05

## 2019-05-23 ASSESSMENT — ENCOUNTER SYMPTOMS
EYE ITCHING: 1
DIARRHEA: 0
ACTIVITY CHANGE: 0
SHORTNESS OF BREATH: 0
SINUS PRESSURE: 0
ARTHRALGIAS: 0
EYE DISCHARGE: 1
FACIAL SWELLING: 0
WHEEZING: 0
JOINT SWELLING: 0
MYALGIAS: 0
HEADACHES: 1
FEVER: 0
RHINORRHEA: 1
FATIGUE: 0
CHEST TIGHTNESS: 0
ADENOPATHY: 0
NAUSEA: 0
EYE REDNESS: 1
COUGH: 0
VOMITING: 0

## 2019-05-23 ASSESSMENT — MIFFLIN-ST. JEOR: SCORE: 2094.5

## 2019-05-23 NOTE — PROGRESS NOTES
SUBJECTIVE:                                                                   Abram Fay presents today to our Allergy Clinic at [facility] for a new patient  follow up visit.  He is a 24-year-old male with autistic disorder, encopresis, obesity, and snoring.  He has possible environmental allergies.  Several years ago, he began to have chronic nasal congestion contributing to the inability to breath through his nose. It frequently changes from one nostril to another. Perennial pattern. Whenever the seasons change, he develops postnasal drip, throat clearing,  cough secondary to postnasal drip, clear rhinorrhea, and sneezing.  He is not sure if he is worse around dogs or cats. But he was told that he was tested positive for cats years ago, >10 years ago.   He tried intranasal fluticasone for several days, and it was somewhat helpful.   He takes cetirizine, and it is partially helpful. There is no history of sinus surgeries or tonsillectomy/adenoidectomy.    He has no episodes of chest tightness, wheezing, or dyspnea.     He has a rash on his arms that he calls eczema, for years. He doesn't use any moisturizers.       Patient Active Problem List   Diagnosis     Active autistic disorder     Encopresis     Eczema     Acute pharyngitis     Obesity     Snoring     Chronic rhinitis     Delinquent immunization status     Keratosis pilaris       Past Medical History:   Diagnosis Date     Autistic disorder, current or active state      Constipation      Encopresis(307.7)      Muscle tone, decreased       Problem (# of Occurrences) Relation (Name,Age of Onset)    Allergies (2) Mother (Maliha): sulfa, Brother (Nicolás)    Asthma (2) Mother (Maliha), Brother (Nicolás)    Food Allergy (1) Father (Abram): Milk        Past Surgical History:   Procedure Laterality Date     CREATE STOMA ANTEGRADE COLONIC ENEMA  7/28/2011    Procedure:CREATE STOMA ANTEGRADE COLONIC ENEMA; Replacement of Appendicostomy  Tube;  Surgeon:ALIA BREWSTER; Location:[facility]     HC CREATE EARDRUM OPENING,GEN ANESTH      3 SETS PE TUBES     PLACEMENT APPENDICOSTOMY       Social History     Socioeconomic History     Marital status: Single     Spouse name: None     Number of children: None     Years of education: None     Highest education level: None   Occupational History     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, [employer],    Social Needs     Financial resource strain: None     Food insecurity:     Worry: None     Inability: None     Transportation needs:     Medical: None     Non-medical: None   Tobacco Use     Smoking status: Never Smoker     Smokeless tobacco: Never Used   Substance and Sexual Activity     Alcohol use: No     Drug use: No     Sexual activity: Never     Comment: 9/8/2011  ek 03/11/13    Lifestyle     Physical activity:     Days per week: None     Minutes per session: None     Stress: None   Relationships     Social connections:     Talks on phone: None     Gets together: None     Attends Zoroastrianism service: None     Active member of club or organization: None     Attends meetings of clubs or organizations: None     Relationship status: None     Intimate partner violence:     Fear of current or ex partner: None     Emotionally abused: None     Physically abused: None     Forced sexual activity: None   Other Topics Concern     Parent/sibling w/ CABG, MI or angioplasty before 65F 55M? Not Asked   Social History Narrative    May 23, 2019    ENVIRONMENTAL HISTORY: [place] is heated with a forced air. They do have central air conditioning. The patient's bedroom is furnished with carpeting in bedroom.  Pets inside the house include 1 cat and 1 dog. There is no history of cockroach or mice infestation. There are no smokers in the house.  The house does not have a damp basement.            Review of Systems   Constitutional: Negative for activity change, fatigue and fever.    HENT: Positive for congestion, ear pain, postnasal drip and rhinorrhea. Negative for dental problem, facial swelling, nosebleeds, sinus pressure and sneezing.    Eyes: Positive for discharge, redness and itching.   Respiratory: Negative for cough, chest tightness, shortness of breath and wheezing.    Cardiovascular: Negative for chest pain.   Gastrointestinal: Negative for diarrhea, nausea and vomiting.   Musculoskeletal: Negative for arthralgias, joint swelling and myalgias.   Skin: Negative for rash.   Neurological: Positive for headaches (sinus).   Hematological: Negative for adenopathy.   Psychiatric/Behavioral: Negative for behavioral problems and self-injury.           Current Outpatient Medications:      cetirizine (ZYRTEC) 10 MG tablet, Take 10 mg by mouth daily, Disp: , Rfl:      fluticasone (FLONASE) 50 MCG/ACT nasal spray, Spray 2 sprays into both nostrils daily, Disp: 16 g, Rfl: 3     MELATONIN 5 MG OR CAPS, 1 tab at HS, Disp: , Rfl:      nystatin (MYCOSTATIN) 274562 UNIT/GM external ointment, Apply topically 2 times daily, Disp: 15 g, Rfl: 0     ORDER FOR DME, AMT mini-one gastrostomy tube buttons, Disp: 1 Device, Rfl: 2     PEG 3350-KCl-NaBcb-NaCl-NaSulf (GOLYTELY PO), Reported on 3/22/2017, Disp: , Rfl:      polyethylene glycol (GOLYTELY) 236 G suspension, Administer 500-700 ml via appendicostomy daily, Disp: 44772 mL, Rfl: 11     pseudoePHEDrine (SUDAFED) 120 MG 12 hr tablet, Take 1 tablet (120 mg) by mouth every 12 hours, Disp: 20 tablet, Rfl: 0     BENADRYL 25 MG OR TABS, 2 tab at HS, Disp: , Rfl:      fluticasone (FLONASE) 50 MCG/ACT nasal spray, Spray 1 spray into both nostrils daily, Disp: , Rfl:      mupirocin (BACTROBAN) 2 % ointment, Apply topically 3 times daily After foot soaks (Patient not taking: Reported on 3/22/2017), Disp: 22 g, Rfl: 1  Immunization History   Administered Date(s) Administered     DTAP (<7y) 04/03/2000     HepB 1995, 1995, 1995     Historical HIB  "1995, 1995, 02/06/1996, 03/27/1997     MMR 03/27/1997, 04/03/2000     Meningococcal (Menactra ) 08/28/2007     Poliovirus, inactivated (IPV) 1995, 1995, 02/06/1996, 04/03/2000     TDAP Vaccine (Boostrix) 08/28/2007     Varicella 07/26/2001     Varicella Pt Report Hx of Varicella/Chicken Pox 04/01/2000     Allergies   Allergen Reactions     No Known Allergies      OBJECTIVE:                                                                 /73 (BP Location: Left arm, Patient Position: Sitting, Cuff Size: Adult Regular)   Pulse 84   Temp 97.6  F (36.4  C) (Oral)   Resp 16   Ht 1.78 m (5' 10.08\")   Wt 109.7 kg (241 lb 13.5 oz)   SpO2 99%   BMI 34.62 kg/m          Physical Exam   Constitutional: He is oriented to person, place, and time. No distress.   HENT:   Head: Normocephalic and atraumatic.   Right Ear: Tympanic membrane, external ear and ear canal normal.   Left Ear: Tympanic membrane, external ear and ear canal normal.   Nose: Mucosal edema and rhinorrhea (clear) present.   Mouth/Throat: Oropharynx is clear and moist and mucous membranes are normal. No oropharyngeal exudate, posterior oropharyngeal edema or posterior oropharyngeal erythema.   Narrow nasal passages.  Hypertrophy of inferior nasal turbinates noted, left >right   Eyes: Conjunctivae are normal. Right eye exhibits no discharge. Left eye exhibits no discharge.   Neck: Normal range of motion.   Cardiovascular: Normal rate, regular rhythm and normal heart sounds.   No murmur heard.  Pulmonary/Chest: Effort normal and breath sounds normal. No respiratory distress. He has no wheezes. He has no rales.   Musculoskeletal: Normal range of motion.   Lymphadenopathy:     He has no cervical adenopathy.   Neurological: He is alert and oriented to person, place, and time.   Skin: Skin is warm. He is not diaphoretic.   perifollicular hyperkeratosis, resembling \"goose bumps\" on both arms consistent with keratosis pilaris   Nursing " note and vitals reviewed.           ASSESSMENT/PLAN:     1. Keratosis pilaris  Skin care regimen reviewed with the patient: Eliminate harsh soaps, i.e., Dial, zest, [place] spring;  Use mild soaps such as Cetaphil or Dove sensitive skin, avoid hot or cold showers, aggressive use of moisturizers including Vanicream, Cetaphil or Aquaphor.      Other Visit Diagnoses     2. Chronic vasomotor rhinitis    -  Primary  Unable to perform percutaneous skin puncture testing for aeroallergens today because the patient did not stop antihistamines.  -Start intranasal fluticasone 2 sprays in each nostril once daily.  - Ordered serum IgE for regional aeroallergen panel.  May consider adding intranasal azelastine depending on symptom control.  Also, may consider a referral to otolaryngology to see if the patient is a candidate for SMRT.    Relevant Medications    cetirizine (ZYRTEC) 10 MG tablet    fluticasone (FLONASE) 50 MCG/ACT nasal spray        Other Relevant Orders    Allergen cat epithellium IgE (Completed)    Allergen dog epithelium IgE (Completed)    Allergen Juliocesar grass IgE (Completed)    Allergen orchard grass IgE (Completed)    Allergen karen IgE (Completed)    Allergen D farinae IgE (Completed)    Allergen D pteronyssinus IgE (Completed)    Allergen alternaria alternata IgE (Completed)    Allergen aspergillus fumigatus IgE (Completed)    Allergen cladosporium herbarum IgE (Completed)    Allergen Epicoccum purpurascens IgE (Completed)    Allergen penicillium notatum IgE (Completed)    Allergen anisha white IgE (Completed)    Allergen [place] IgE (Completed)    Allergen cottonwood IgE (Completed)    Allergen elm IgE (Completed)    Allergen maple box elder IgE (Completed)    Allergen oak white IgE (Completed)    Allergen Red [place] IgE (Completed)    Allergen silver  birch IgE (Completed)    Allergen Tree White [place] IgE (Completed)    Allergen [place] Tree (Completed)    Allergen white pine IgE (Completed)    Allergen  English plantain IgE (Completed)    Allergen giant ragweed IgE (Completed)    Allergen lamb's quarter IgE (Completed)    Allergen Mugwort IgE (Completed)    Allergen ragweed short IgE (Completed)    Allergen Sagebrush [place]ormwood IgE (Completed)    Allergen Sheep Sorrel IgE (Completed)    Allergen thistle Russian IgE (Completed)    Allergen Weed Nettle IgE (Completed)    Allergen, Kochia/Firebush (Completed)    IgE (Completed)        Return in about 5 weeks (around 6/27/2019), or if symptoms worsen or fail to improve.    Thank you for allowing us to participate in the care of this patient. Please feel free to contact us if there are any questions or concerns about the patient.    Disclaimer: This note consists of symbols derived from keyboarding, dictation and/or voice recognition software. As a result, there may be errors in the script that have gone undetected. Please consider this when interpreting information found in this chart.    Arnel Sung MD, FAAAAI, FACAAI  Allergy, Asthma and Immunology  [place], [place] and [place]

## 2019-05-23 NOTE — LETTER
5/23/2019         RE: Abram Fay  [address] [facility] [address]        Dear Colleague,    Thank you for referring your patient, Abram Fay, to the [facility]. Please see a copy of my visit note below.    SUBJECTIVE:                                                                   Abram Fay presents today to our Allergy Clinic at [facility] for a new patient  follow up visit.  He is a 24-year-old male with autistic disorder, encopresis, obesity, and snoring.  He has possible environmental allergies.  Several years ago, he began to have chronic nasal congestion contributing to the inability to breath through his nose. It frequently changes from one nostril to another. Perennial pattern. Whenever the seasons change, he develops postnasal drip, throat clearing,  cough secondary to postnasal drip, clear rhinorrhea, and sneezing.  He is not sure if he is worse around dogs or cats. But he was told that he was tested positive for cats years ago, >10 years ago.   He tried intranasal fluticasone for several days, and it was somewhat helpful.   He takes cetirizine, and it is partially helpful. There is no history of sinus surgeries or tonsillectomy/adenoidectomy.    He has no episodes of chest tightness, wheezing, or dyspnea.     He has a rash on his arms that he calls eczema, for years. He doesn't use any moisturizers.       Patient Active Problem List   Diagnosis     Active autistic disorder     Encopresis     Eczema     Acute pharyngitis     Obesity     Snoring     Chronic rhinitis     Delinquent immunization status     Keratosis pilaris       Past Medical History:   Diagnosis Date     Autistic disorder, current or active state      Constipation      Encopresis(307.7)      Muscle tone, decreased       Problem (# of Occurrences) Relation (Name,Age of Onset)    Allergies (2) Mother (Maliha): sulfa, Brother (Nicolás)    Asthma (2) Mother (Maliha), Brother  (Nicolás)    Food Allergy (1) Father (Abram): Milk        Past Surgical History:   Procedure Laterality Date     CREATE STOMA ANTEGRADE COLONIC ENEMA  7/28/2011    Procedure:CREATE STOMA ANTEGRADE COLONIC ENEMA; Replacement of Appendicostomy  Tube; Surgeon:ALIA BREWSTER; Location:[facility]     HC CREATE EARDRUM OPENING,GEN ANESTH      3 SETS PE TUBES     PLACEMENT APPENDICOSTOMY       Social History     Socioeconomic History     Marital status: Single     Spouse name: None     Number of children: None     Years of education: None     Highest education level: None   Occupational History     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, [employer],    Social Needs     Financial resource strain: None     Food insecurity:     Worry: None     Inability: None     Transportation needs:     Medical: None     Non-medical: None   Tobacco Use     Smoking status: Never Smoker     Smokeless tobacco: Never Used   Substance and Sexual Activity     Alcohol use: No     Drug use: No     Sexual activity: Never     Comment: 9/8/2011  ek 03/11/13    Lifestyle     Physical activity:     Days per week: None     Minutes per session: None     Stress: None   Relationships     Social connections:     Talks on phone: None     Gets together: None     Attends Church service: None     Active member of club or organization: None     Attends meetings of clubs or organizations: None     Relationship status: None     Intimate partner violence:     Fear of current or ex partner: None     Emotionally abused: None     Physically abused: None     Forced sexual activity: None   Other Topics Concern     Parent/sibling w/ CABG, MI or angioplasty before 65F 55M? Not Asked   Social History Narrative    May 23, 2019    ENVIRONMENTAL HISTORY: [place] home is heated with a forced air. They do have central air conditioning. The patient's bedroom is furnished with carpeting in bedroom.  Pets inside the house include  1 cat and 1 dog. There is no history of cockroach or mice infestation. There are no smokers in the house.  The house does not have a damp basement.            Review of Systems   Constitutional: Negative for activity change, fatigue and fever.   HENT: Positive for congestion, ear pain, postnasal drip and rhinorrhea. Negative for dental problem, facial swelling, nosebleeds, sinus pressure and sneezing.    Eyes: Positive for discharge, redness and itching.   Respiratory: Negative for cough, chest tightness, shortness of breath and wheezing.    Cardiovascular: Negative for chest pain.   Gastrointestinal: Negative for diarrhea, nausea and vomiting.   Musculoskeletal: Negative for arthralgias, joint swelling and myalgias.   Skin: Negative for rash.   Neurological: Positive for headaches (sinus).   Hematological: Negative for adenopathy.   Psychiatric/Behavioral: Negative for behavioral problems and self-injury.           Current Outpatient Medications:      cetirizine (ZYRTEC) 10 MG tablet, Take 10 mg by mouth daily, Disp: , Rfl:      fluticasone (FLONASE) 50 MCG/ACT nasal spray, Spray 2 sprays into both nostrils daily, Disp: 16 g, Rfl: 3     MELATONIN 5 MG OR CAPS, 1 tab at HS, Disp: , Rfl:      nystatin (MYCOSTATIN) 424791 UNIT/GM external ointment, Apply topically 2 times daily, Disp: 15 g, Rfl: 0     ORDER FOR DME, AMT mini-one gastrostomy tube buttons, Disp: 1 Device, Rfl: 2     PEG 3350-KCl-NaBcb-NaCl-NaSulf (GOLYTELY PO), Reported on 3/22/2017, Disp: , Rfl:      polyethylene glycol (GOLYTELY) 236 G suspension, Administer 500-700 ml via appendicostomy daily, Disp: 95307 mL, Rfl: 11     pseudoePHEDrine (SUDAFED) 120 MG 12 hr tablet, Take 1 tablet (120 mg) by mouth every 12 hours, Disp: 20 tablet, Rfl: 0     BENADRYL 25 MG OR TABS, 2 tab at HS, Disp: , Rfl:      fluticasone (FLONASE) 50 MCG/ACT nasal spray, Spray 1 spray into both nostrils daily, Disp: , Rfl:      mupirocin (BACTROBAN) 2 % ointment, Apply topically 3  "times daily After foot soaks (Patient not taking: Reported on 3/22/2017), Disp: 22 g, Rfl: 1  Immunization History   Administered Date(s) Administered     DTAP (<7y) 04/03/2000     HepB 1995, 1995, 1995     Historical HIB 1995, 1995, 02/06/1996, 03/27/1997     MMR 03/27/1997, 04/03/2000     Meningococcal (Menactra ) 08/28/2007     Poliovirus, inactivated (IPV) 1995, 1995, 02/06/1996, 04/03/2000     TDAP Vaccine (Boostrix) 08/28/2007     Varicella 07/26/2001     Varicella Pt Report Hx of Varicella/Chicken Pox 04/01/2000     Allergies   Allergen Reactions     No Known Allergies      OBJECTIVE:                                                                 /73 (BP Location: Left arm, Patient Position: Sitting, Cuff Size: Adult Regular)   Pulse 84   Temp 97.6  F (36.4  C) (Oral)   Resp 16   Ht 1.78 m (5' 10.08\")   Wt 109.7 kg (241 lb 13.5 oz)   SpO2 99%   BMI 34.62 kg/m           Physical Exam   Constitutional: He is oriented to person, place, and time. No distress.   HENT:   Head: Normocephalic and atraumatic.   Right Ear: Tympanic membrane, external ear and ear canal normal.   Left Ear: Tympanic membrane, external ear and ear canal normal.   Nose: Mucosal edema and rhinorrhea (clear) present.   Mouth/Throat: Oropharynx is clear and moist and mucous membranes are normal. No oropharyngeal exudate, posterior oropharyngeal edema or posterior oropharyngeal erythema.   Narrow nasal passages.  Hypertrophy of inferior nasal turbinates noted, left >right   Eyes: Conjunctivae are normal. Right eye exhibits no discharge. Left eye exhibits no discharge.   Neck: Normal range of motion.   Cardiovascular: Normal rate, regular rhythm and normal heart sounds.   No murmur heard.  Pulmonary/Chest: Effort normal and breath sounds normal. No respiratory distress. He has no wheezes. He has no rales.   Musculoskeletal: Normal range of motion.   Lymphadenopathy:     He has no cervical " "adenopathy.   Neurological: He is alert and oriented to person, place, and time.   Skin: Skin is warm. He is not diaphoretic.   perifollicular hyperkeratosis, resembling \"goose bumps\" on both arms consistent with keratosis pilaris   Nursing note and vitals reviewed.           ASSESSMENT/PLAN:     1. Keratosis pilaris  Skin care regimen reviewed with the patient: Eliminate harsh soaps, i.e., Dial, zest, Chinese spring;  Use mild soaps such as Cetaphil or Dove sensitive skin, avoid hot or cold showers, aggressive use of moisturizers including Vanicream, Cetaphil or Aquaphor.      Other Visit Diagnoses     2. Chronic vasomotor rhinitis    -  Primary  Unable to perform percutaneous skin puncture testing for aeroallergens today because the patient did not stop antihistamines.  -Start intranasal fluticasone 2 sprays in each nostril once daily.  - Ordered serum IgE for regional aeroallergen panel.  May consider adding intranasal azelastine depending on symptom control.  Also, may consider a referral to otolaryngology to see if the patient is a candidate for SMRT.    Relevant Medications    cetirizine (ZYRTEC) 10 MG tablet    fluticasone (FLONASE) 50 MCG/ACT nasal spray        Other Relevant Orders    Allergen cat epithellium IgE (Completed)    Allergen dog epithelium IgE (Completed)    Allergen Juliocesar grass IgE (Completed)    Allergen orchard grass IgE (Completed)    Allergen karen IgE (Completed)    Allergen D farinae IgE (Completed)    Allergen D pteronyssinus IgE (Completed)    Allergen alternaria alternata IgE (Completed)    Allergen aspergillus fumigatus IgE (Completed)    Allergen cladosporium herbarum IgE (Completed)    Allergen Epicoccum purpurascens IgE (Completed)    Allergen penicillium notatum IgE (Completed)    Allergen anisha white IgE (Completed)    Allergen [place] IgE (Completed)    Allergen cottonwood IgE (Completed)    Allergen elm IgE (Completed)    Allergen maple box elder IgE (Completed)    Allergen oak " white IgE (Completed)    Allergen Red [place] IgE (Completed)    Allergen silver  birch IgE (Completed)    Allergen Tree White [place] IgE (Completed)    Allergen [place] Tree (Completed)    Allergen white pine IgE (Completed)    Allergen English plantain IgE (Completed)    Allergen giant ragweed IgE (Completed)    Allergen lamb's quarter IgE (Completed)    Allergen Mugwort IgE (Completed)    Allergen ragweed short IgE (Completed)    Allergen Sagebrush [place] IgE (Completed)    Allergen Sheep Sorrel IgE (Completed)    Allergen thistle Russian IgE (Completed)    Allergen Weed Nettle IgE (Completed)    Allergen, Kochia/Firebush (Completed)    IgE (Completed)        Return in about 5 weeks (around 6/27/2019), or if symptoms worsen or fail to improve.    Thank you for allowing us to participate in the care of this patient. Please feel free to contact us if there are any questions or concerns about the patient.    Disclaimer: This note consists of symbols derived from keyboarding, dictation and/or voice recognition software. As a result, there may be errors in the script that have gone undetected. Please consider this when interpreting information found in this chart.    Arnel Sung MD, FAAAAI, FACAAI  Allergy, Asthma and Immunology  [place], [place] and [place]      Again, thank you for allowing me to participate in the care of your patient.        Sincerely,        Arnel Sung MD

## 2019-05-23 NOTE — PATIENT INSTRUCTIONS
-start Flonase 2 sprays/each nostril once a day.  Get the bloodwork done.       Eliminate harsh soaps, i.e., Dial, zest, [place]e spring;  Use mild soaps such as Cetaphil or Dove sensitive skin, avoid hot or cold showers. Recommend aggressive use of moisturizers including Vanicream, Cetaphil or Aquaphor.

## 2019-05-24 LAB
CALIF WALNUT IGE QN: <0.1 KU/L
DEPRECATED MISC ALLERGEN IGE RAST QL: NORMAL

## 2019-05-29 LAB
A ALTERNATA IGE QN: <0.1 KU(A)/L
A FUMIGATUS IGE QN: <0.1 KU(A)/L
C HERBARUM IGE QN: <0.1 KU(A)/L
CAT DANDER IGG QN: 63.8 KU(A)/L
CEDAR IGE QN: <0.1 KU(A)/L
COCKSFOOT IGE QN: <0.1 KU(A)/L
COMMON RAGWEED IGE QN: 0.13 KU(A)/L
COTTONWOOD IGE QN: <0.1 KU(A)/L
D FARINAE IGE QN: 17.6 KU(A)/L
D PTERONYSS IGE QN: 15.8 KU(A)/L
DOG DANDER+EPITH IGE QN: 18.9 KU(A)/L
E PURPURASCENS IGE QN: <0.1 KU(A)/L
EAST WHITE PINE IGE QN: <0.1 KU(A)/L
ENGL PLANTAIN IGE QN: <0.1 KU(A)/L
FIREBUSH IGE QN: <0.1 KU(A)/L
GIANT RAGWEED IGE QN: <0.1 KU(A)/L
GOOSEFOOT IGE QN: <0.1 KU(A)/L
IGE SERPL-ACNC: 352 KIU/L (ref 0–114)
JOHNSON GRASS IGE QN: <0.1 KU(A)/L
MAPLE IGE QN: <0.1 KU(A)/L
MUGWORT IGE QN: <0.1 KU(A)/L
NETTLE IGE QN: 0.19 KU(A)/L
P NOTATUM IGE QN: <0.1 KU(A)/L
RED MULBERRY IGE QN: <0.1 KU(A)/L
SALTWORT IGE QN: <0.1 KU(A)/L
SHEEP SORREL IGE QN: <0.1 KU(A)/L
SILVER BIRCH IGE QN: <0.1 KU(A)/L
TIMOTHY IGE QN: <0.1 KU(A)/L
WHITE ASH IGE QN: 1.54 KU(A)/L
WHITE ELM IGE QN: 0.1 KU(A)/L
WHITE MULBERRY IGE QN: <0.1 KU(A)/L
WHITE OAK IGE QN: <0.1 KU(A)/L
WORMWOOD IGE QN: <0.1 KU(A)/L

## 2019-05-30 NOTE — PROGRESS NOTES
Elevated total serum IgE which is not uncommon for the patients with environmental allergies.  Sensitivity to cat, dust mite, dog, and pollen of white anisha noted.  Slight sensitivity to Ragweed and Nettle noted.  -Please discuss avoidance measures.  -No changes in the treatment plan.  Arnel Sung

## 2019-05-30 NOTE — RESULT ENCOUNTER NOTE
Elevated total serum IgE which is not uncommon for the patients with environmental allergies.  Sensitivity to cat, dust mite, dog, and pollen of white anisha noted.  Slight sensitivity to Ragweed and Nettle noted.  -Please discuss avoidance measures.  -No changes in the treatment plan.

## 2019-07-11 ENCOUNTER — OFFICE VISIT (OUTPATIENT)
Dept: ALLERGY | Facility: CLINIC | Age: 24
End: 2019-07-11
Payer: COMMERCIAL

## 2019-07-11 VITALS
WEIGHT: 242.73 LBS | HEART RATE: 74 BPM | BODY MASS INDEX: 34.75 KG/M2 | RESPIRATION RATE: 16 BRPM | TEMPERATURE: 98.3 F | SYSTOLIC BLOOD PRESSURE: 110 MMHG | DIASTOLIC BLOOD PRESSURE: 66 MMHG | OXYGEN SATURATION: 97 %

## 2019-07-11 DIAGNOSIS — J30.81 NON-SEASONAL ALLERGIC RHINITIS DUE TO ANIMAL HAIR AND DANDER: ICD-10-CM

## 2019-07-11 DIAGNOSIS — J30.1 SEASONAL ALLERGIC RHINITIS DUE TO POLLEN: ICD-10-CM

## 2019-07-11 DIAGNOSIS — J30.89 ALLERGIC RHINITIS DUE TO DUST MITE: Primary | ICD-10-CM

## 2019-07-11 PROCEDURE — 99214 OFFICE O/P EST MOD 30 MIN: CPT | Performed by: ALLERGY & IMMUNOLOGY

## 2019-07-11 RX ORDER — AZELASTINE 1 MG/ML
2 SPRAY, METERED NASAL 2 TIMES DAILY PRN
Qty: 30 ML | Refills: 3 | Status: SHIPPED | OUTPATIENT
Start: 2019-07-11 | End: 2021-01-22 | Stop reason: ALTCHOICE

## 2019-07-11 ASSESSMENT — ENCOUNTER SYMPTOMS
FACIAL SWELLING: 0
COUGH: 0
VOMITING: 0
EYE REDNESS: 0
HEADACHES: 0
SHORTNESS OF BREATH: 0
FEVER: 0
CHEST TIGHTNESS: 0
NAUSEA: 0
RHINORRHEA: 1
ACTIVITY CHANGE: 0
EYE ITCHING: 0
DIARRHEA: 0
WHEEZING: 0
SINUS PRESSURE: 0
EYE DISCHARGE: 0
JOINT SWELLING: 0
ARTHRALGIAS: 0
MYALGIAS: 0
FATIGUE: 0
ADENOPATHY: 0

## 2019-07-11 NOTE — LETTER
7/11/2019         RE: Abram Fay  [address] [facility] [id number]        Dear Colleague,    Thank you for referring your patient, Abram Fay, to the [facility]. Please see a copy of my visit note below.    SUBJECTIVE:                                                                   Abram Fay presents today to our Allergy Clinic at [facility] for a follow up visit.   He is a 24 year old male with allergic rhinitis.   In May 2019, sensitivity to cat, dust mite, dog, and pollen of white anisha noted.  Slight sensitivity to Ragweed and Nettle noted.    Regarding his allergic rhinitis, he has been using intranasal fluticasone 2 sprays/side daily and taking cetirizine 10 mg by mouth once daily at bedtime. The patient states he is doing well on this regimen. He may develop symptoms (nasal congestion, rhinorrhea, and postnasal drip) if he forgets taking/using meds consistently. He has improved by  50%. He denies interval sinusitis symptoms of fever, facial pain or purulent rhinorrhea.    Patient Active Problem List   Diagnosis     Active autistic disorder     Encopresis     Eczema     Acute pharyngitis     Obesity     Snoring     Chronic rhinitis     Delinquent immunization status     Keratosis pilaris       Past Medical History:   Diagnosis Date     Autistic disorder, current or active state      Constipation      Encopresis(307.7)      Muscle tone, decreased       Problem (# of Occurrences) Relation (Name,Age of Onset)    Allergies (2) Mother (Maliha): sulfa, Brother (Nicolás)    Asthma (2) Mother (Maliha), Brother (Nicolás)    Food Allergy (1) Father (Abram): Milk        Past Surgical History:   Procedure Laterality Date     CREATE STOMA ANTEGRADE COLONIC ENEMA  7/28/2011    Procedure:CREATE STOMA ANTEGRADE COLONIC ENEMA; Replacement of Appendicostomy  Tube; Surgeon:ALIA BREWSTER; Location:[facility]     HC CREATE EARDRUM OPENING,GEN ANESTH      3  SETS PE TUBES     PLACEMENT APPENDICOSTOMY       Social History     Socioeconomic History     Marital status: Single     Spouse name: None     Number of children: None     Years of education: None     Highest education level: None   Occupational History     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, [employer],    Social Needs     Financial resource strain: None     Food insecurity:     Worry: None     Inability: None     Transportation needs:     Medical: None     Non-medical: None   Tobacco Use     Smoking status: Never Smoker     Smokeless tobacco: Never Used   Substance and Sexual Activity     Alcohol use: No     Drug use: No     Sexual activity: Never     Comment: 9/8/2011  ek 03/11/13    Lifestyle     Physical activity:     Days per week: None     Minutes per session: None     Stress: None   Relationships     Social connections:     Talks on phone: None     Gets together: None     Attends Church service: None     Active member of club or organization: None     Attends meetings of clubs or organizations: None     Relationship status: None     Intimate partner violence:     Fear of current or ex partner: None     Emotionally abused: None     Physically abused: None     Forced sexual activity: None   Other Topics Concern     Parent/sibling w/ CABG, MI or angioplasty before 65F 55M? Not Asked   Social History Narrative    May 23, 2019    ENVIRONMENTAL HISTORY: [place] home is heated with a forced air. They do have central air conditioning. The patient's bedroom is furnished with carpeting in bedroom.  Pets inside the house include 1 cat and 1 dog. There is no history of cockroach or mice infestation. There are no smokers in the house.  The house does not have a damp basement.            Review of Systems   Constitutional: Negative for activity change, fatigue and fever.   HENT: Positive for congestion, postnasal drip, rhinorrhea and sneezing. Negative for dental  problem, ear pain, facial swelling, nosebleeds and sinus pressure.    Eyes: Negative for discharge, redness and itching.   Respiratory: Negative for cough, chest tightness, shortness of breath and wheezing.    Cardiovascular: Negative for chest pain.   Gastrointestinal: Negative for diarrhea, nausea and vomiting.   Musculoskeletal: Negative for arthralgias, joint swelling and myalgias.   Skin: Negative for rash.   Neurological: Negative for headaches.   Hematological: Negative for adenopathy.   Psychiatric/Behavioral: Negative for behavioral problems and self-injury.           Current Outpatient Medications:      azelastine (ASTELIN) 0.1 % nasal spray, Spray 2 sprays into both nostrils 2 times daily as needed for rhinitis, Disp: 30 mL, Rfl: 3     BENADRYL 25 MG OR TABS, 2 tab at HS, Disp: , Rfl:      cetirizine (ZYRTEC) 10 MG tablet, Take 10 mg by mouth daily, Disp: , Rfl:      fluticasone (FLONASE) 50 MCG/ACT nasal spray, Spray 1 spray into both nostrils daily, Disp: , Rfl:      MELATONIN 5 MG OR CAPS, 1 tab at HS, Disp: , Rfl:      ORDER FOR DME, AMT mini-one gastrostomy tube buttons, Disp: 1 Device, Rfl: 2     PEG 3350-KCl-NaBcb-NaCl-NaSulf (GOLYTELY PO), Reported on 3/22/2017, Disp: , Rfl:      polyethylene glycol (GOLYTELY) 236 G suspension, Administer 500-700 ml via appendicostomy daily, Disp: 99163 mL, Rfl: 11     fluticasone (FLONASE) 50 MCG/ACT nasal spray, Spray 2 sprays into both nostrils daily, Disp: 16 g, Rfl: 3     mupirocin (BACTROBAN) 2 % ointment, Apply topically 3 times daily After foot soaks (Patient not taking: Reported on 3/22/2017), Disp: 22 g, Rfl: 1     nystatin (MYCOSTATIN) 357642 UNIT/GM external ointment, Apply topically 2 times daily (Patient not taking: Reported on 7/11/2019), Disp: 15 g, Rfl: 0  Immunization History   Administered Date(s) Administered     DTAP (<7y) 04/03/2000     HepB 1995, 1995, 1995     Historical HIB 1995, 1995, 02/06/1996, 03/27/1997  "    MMR 03/27/1997, 04/03/2000     Meningococcal (Menactra ) 08/28/2007     Poliovirus, inactivated (IPV) 1995, 1995, 02/06/1996, 04/03/2000     TDAP Vaccine (Boostrix) 08/28/2007     Varicella 07/26/2001     Varicella Pt Report Hx of Varicella/Chicken Pox 04/01/2000     Allergies   Allergen Reactions     No Known Allergies      OBJECTIVE:                                                                 /66 (BP Location: Left arm, Patient Position: Sitting, Cuff Size: Adult Regular)   Pulse 74   Temp 98.3  F (36.8  C) (Tympanic)   Resp 16   Wt 110.1 kg (242 lb 11.6 oz)   SpO2 97%   BMI 34.75 kg/m           Physical Exam   Constitutional: He is oriented to person, place, and time. No distress.   HENT:   Head: Normocephalic and atraumatic.   Right Ear: Tympanic membrane, external ear and ear canal normal.   Left Ear: Tympanic membrane, external ear and ear canal normal.   Nose: Mucosal edema and rhinorrhea (clear) present.   Mouth/Throat: Oropharynx is clear and moist and mucous membranes are normal. No oropharyngeal exudate, posterior oropharyngeal edema or posterior oropharyngeal erythema.   Narrow nasal passages.  Hypertrophy of inferior nasal turbinates noted, left >right   Eyes: Conjunctivae are normal. Right eye exhibits no discharge. Left eye exhibits no discharge.   Neck: Normal range of motion.   Cardiovascular: Normal rate, regular rhythm and normal heart sounds.   No murmur heard.  Pulmonary/Chest: Effort normal and breath sounds normal. No respiratory distress. He has no wheezes. He has no rales.   Musculoskeletal: Normal range of motion.   Lymphadenopathy:     He has no cervical adenopathy.   Neurological: He is alert and oriented to person, place, and time.   Skin: Skin is warm. He is not diaphoretic.   perifollicular hyperkeratosis, resembling \"goose bumps\" on both arms consistent with keratosis pilaris   Nursing note and vitals reviewed.           ASSESSMENT/PLAN:    1. Allergic " rhinitis due to dust mite  (primary encounter diagnosis)  2. Non-seasonal allergic rhinitis due to animal hair and dander 3. Seasonal allergic rhinitis due to pollen    Improved, but still suboptimally controlled with intranasal fluticasone and cetirizine.  -Continue intranasal fluticasone 2 sprays in each nostril once daily.  Encouraged to be more consistent with it.  - Start azelastine 2 sprays in each nostril twice daily as needed.  - Take cetirizine 10 mg by mouth once daily as needed.  If symptoms persist despite medications and allergen avoidance, or if medications are not tolerated, allergen immunotherapy is recommended. Discussed briefly about allergen immunotherapy today.    azelastine (ASTELIN) 0.1 % nasal spray                Return in about 3 months (around 10/11/2019), or if symptoms worsen or fail to improve.    Thank you for allowing us to participate in the care of this patient. Please feel free to contact us if there are any questions or concerns about the patient.    Disclaimer: This note consists of symbols derived from keyboarding, dictation and/or voice recognition software. As a result, there may be errors in the script that have gone undetected. Please consider this when interpreting information found in this chart.    Arnel Sung MD, FAAAAI, FACAAI  Allergy, Asthma and Immunology  [place], [place] and [place]      Again, thank you for allowing me to participate in the care of your patient.        Sincerely,        Arnel Sung MD

## 2019-07-11 NOTE — PROGRESS NOTES
SUBJECTIVE:                                                                   Abram Fay presents today to our Allergy Clinic at [facility] for a follow up visit.   He is a 24 year old male with allergic rhinitis.   In May 2019, sensitivity to cat, dust mite, dog, and pollen of white anisha noted.  Slight sensitivity to Ragweed and Nettle noted.    Regarding his allergic rhinitis, he has been using intranasal fluticasone 2 sprays/side daily and taking cetirizine 10 mg by mouth once daily at bedtime. The patient states he is doing well on this regimen. He may develop symptoms (nasal congestion, rhinorrhea, and postnasal drip) if he forgets taking/using meds consistently. He has improved by  50%. He denies interval sinusitis symptoms of fever, facial pain or purulent rhinorrhea.    Patient Active Problem List   Diagnosis     Active autistic disorder     Encopresis     Eczema     Acute pharyngitis     Obesity     Snoring     Chronic rhinitis     Delinquent immunization status     Keratosis pilaris       Past Medical History:   Diagnosis Date     Autistic disorder, current or active state      Constipation      Encopresis(307.7)      Muscle tone, decreased       Problem (# of Occurrences) Relation (Name,Age of Onset)    Allergies (2) Mother (Maliha): sulfa, Brother (Nicolás)    Asthma (2) Mother (Maliha), Brother (Nicolás)    Food Allergy (1) Father (Abram): Milk        Past Surgical History:   Procedure Laterality Date     CREATE STOMA ANTEGRADE COLONIC ENEMA  7/28/2011    Procedure:CREATE STOMA ANTEGRADE COLONIC ENEMA; Replacement of Appendicostomy  Tube; Surgeon:ALIA BREWSTER; [facility]      CREATE EARDRUM OPENING,GEN ANESTH      3 SETS PE TUBES     PLACEMENT APPENDICOSTOMY       Social History     Socioeconomic History     Marital status: Single     Spouse name: None     Number of children: None     Years of education: None     Highest education level: None   Occupational  History     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, [employer],    Social Needs     Financial resource strain: None     Food insecurity:     Worry: None     Inability: None     Transportation needs:     Medical: None     Non-medical: None   Tobacco Use     Smoking status: Never Smoker     Smokeless tobacco: Never Used   Substance and Sexual Activity     Alcohol use: No     Drug use: No     Sexual activity: Never     Comment: 9/8/2011  ek 03/11/13    Lifestyle     Physical activity:     Days per week: None     Minutes per session: None     Stress: None   Relationships     Social connections:     Talks on phone: None     Gets together: None     Attends Adventism service: None     Active member of club or organization: None     Attends meetings of clubs or organizations: None     Relationship status: None     Intimate partner violence:     Fear of current or ex partner: None     Emotionally abused: None     Physically abused: None     Forced sexual activity: None   Other Topics Concern     Parent/sibling w/ CABG, MI or angioplasty before 65F 55M? Not Asked   Social History Narrative    May 23, 2019    ENVIRONMENTAL HISTORY: [place] The home is heated with a forced air. They do have central air conditioning. The patient's bedroom is furnished with carpeting in bedroom.  Pets inside the house include 1 cat and 1 dog. There is no history of cockroach or mice infestation. There are no smokers in the house.  The house does not have a damp basement.            Review of Systems   Constitutional: Negative for activity change, fatigue and fever.   HENT: Positive for congestion, postnasal drip, rhinorrhea and sneezing. Negative for dental problem, ear pain, facial swelling, nosebleeds and sinus pressure.    Eyes: Negative for discharge, redness and itching.   Respiratory: Negative for cough, chest tightness, shortness of breath and wheezing.    Cardiovascular: Negative for chest pain.    Gastrointestinal: Negative for diarrhea, nausea and vomiting.   Musculoskeletal: Negative for arthralgias, joint swelling and myalgias.   Skin: Negative for rash.   Neurological: Negative for headaches.   Hematological: Negative for adenopathy.   Psychiatric/Behavioral: Negative for behavioral problems and self-injury.           Current Outpatient Medications:      azelastine (ASTELIN) 0.1 % nasal spray, Spray 2 sprays into both nostrils 2 times daily as needed for rhinitis, Disp: 30 mL, Rfl: 3     BENADRYL 25 MG OR TABS, 2 tab at HS, Disp: , Rfl:      cetirizine (ZYRTEC) 10 MG tablet, Take 10 mg by mouth daily, Disp: , Rfl:      fluticasone (FLONASE) 50 MCG/ACT nasal spray, Spray 1 spray into both nostrils daily, Disp: , Rfl:      MELATONIN 5 MG OR CAPS, 1 tab at HS, Disp: , Rfl:      ORDER FOR DME, AMT mini-one gastrostomy tube buttons, Disp: 1 Device, Rfl: 2     PEG 3350-KCl-NaBcb-NaCl-NaSulf (GOLYTELY PO), Reported on 3/22/2017, Disp: , Rfl:      polyethylene glycol (GOLYTELY) 236 G suspension, Administer 500-700 ml via appendicostomy daily, Disp: 26138 mL, Rfl: 11     fluticasone (FLONASE) 50 MCG/ACT nasal spray, Spray 2 sprays into both nostrils daily, Disp: 16 g, Rfl: 3     mupirocin (BACTROBAN) 2 % ointment, Apply topically 3 times daily After foot soaks (Patient not taking: Reported on 3/22/2017), Disp: 22 g, Rfl: 1     nystatin (MYCOSTATIN) 573086 UNIT/GM external ointment, Apply topically 2 times daily (Patient not taking: Reported on 7/11/2019), Disp: 15 g, Rfl: 0  Immunization History   Administered Date(s) Administered     DTAP (<7y) 04/03/2000     HepB 1995, 1995, 1995     Historical HIB 1995, 1995, 02/06/1996, 03/27/1997     MMR 03/27/1997, 04/03/2000     Meningococcal (Menactra ) 08/28/2007     Poliovirus, inactivated (IPV) 1995, 1995, 02/06/1996, 04/03/2000     TDAP Vaccine (Boostrix) 08/28/2007     Varicella 07/26/2001     Varicella Pt Report Hx of  "Varicella/Chicken Pox 04/01/2000     Allergies   Allergen Reactions     No Known Allergies      OBJECTIVE:                                                                 /66 (BP Location: Left arm, Patient Position: Sitting, Cuff Size: Adult Regular)   Pulse 74   Temp 98.3  F (36.8  C) (Tympanic)   Resp 16   Wt 110.1 kg (242 lb 11.6 oz)   SpO2 97%   BMI 34.75 kg/m          Physical Exam   Constitutional: He is oriented to person, place, and time. No distress.   HENT:   Head: Normocephalic and atraumatic.   Right Ear: Tympanic membrane, external ear and ear canal normal.   Left Ear: Tympanic membrane, external ear and ear canal normal.   Nose: Mucosal edema and rhinorrhea (clear) present.   Mouth/Throat: Oropharynx is clear and moist and mucous membranes are normal. No oropharyngeal exudate, posterior oropharyngeal edema or posterior oropharyngeal erythema.   Narrow nasal passages.  Hypertrophy of inferior nasal turbinates noted, left >right   Eyes: Conjunctivae are normal. Right eye exhibits no discharge. Left eye exhibits no discharge.   Neck: Normal range of motion.   Cardiovascular: Normal rate, regular rhythm and normal heart sounds.   No murmur heard.  Pulmonary/Chest: Effort normal and breath sounds normal. No respiratory distress. He has no wheezes. He has no rales.   Musculoskeletal: Normal range of motion.   Lymphadenopathy:     He has no cervical adenopathy.   Neurological: He is alert and oriented to person, place, and time.   Skin: Skin is warm. He is not diaphoretic.   perifollicular hyperkeratosis, resembling \"goose bumps\" on both arms consistent with keratosis pilaris   Nursing note and vitals reviewed.           ASSESSMENT/PLAN:    1. Allergic rhinitis due to dust mite  (primary encounter diagnosis)  2. Non-seasonal allergic rhinitis due to animal hair and dander 3. Seasonal allergic rhinitis due to pollen    Improved, but still suboptimally controlled with intranasal fluticasone and " cetirizine.  -Continue intranasal fluticasone 2 sprays in each nostril once daily.  Encouraged to be more consistent with it.  - Start azelastine 2 sprays in each nostril twice daily as needed.  - Take cetirizine 10 mg by mouth once daily as needed.  If symptoms persist despite medications and allergen avoidance, or if medications are not tolerated, allergen immunotherapy is recommended. Discussed briefly about allergen immunotherapy today.    azelastine (ASTELIN) 0.1 % nasal spray                Return in about 3 months (around 10/11/2019), or if symptoms worsen or fail to improve.    Thank you for allowing us to participate in the care of this patient. Please feel free to contact us if there are any questions or concerns about the patient.    Disclaimer: This note consists of symbols derived from keyboarding, dictation and/or voice recognition software. As a result, there may be errors in the script that have gone undetected. Please consider this when interpreting information found in this chart.    Arnel Sung MD, FAAAAI, FACAAI  Allergy, Asthma and Immunology  [place], [place] and [place]

## 2019-07-11 NOTE — PATIENT INSTRUCTIONS
Continue Flonase as is.  -Use azelastine 2 sprays in each nostril twice a day when necessary.  Take cetirizine 10 mg by mouth once daily as needed.

## 2019-10-25 ENCOUNTER — TELEPHONE (OUTPATIENT)
Dept: PEDIATRICS | Facility: CLINIC | Age: 24
End: 2019-10-25

## 2019-10-25 NOTE — TELEPHONE ENCOUNTER
Reason for Call:  Other call back    Detailed comments: Patient's mother is calling asking about [employer]. Patient is now 24 and they have moved and have a new primary. Patient's new provider stated that it would be better if Dr English filled out paperwork for patient as Dr English knows patient. Patient does have autism. Please call mother to discuss what the best option would be.    Phone Number Patient can be reached at: Other phone number:  [phone]    Best Time: any    Can we leave a detailed message on this number? YES    Call taken on 10/25/2019 at 11:12 AM by ASTER GUERRA

## 2019-10-25 NOTE — TELEPHONE ENCOUNTER
LM for mom that pt needs to f/u with his primary care provider regarding this. Dr. English is not the one to fill out this form for pt.

## 2019-12-05 ENCOUNTER — OFFICE VISIT (OUTPATIENT)
Dept: FAMILY MEDICINE | Facility: CLINIC | Age: 24
End: 2019-12-05
Payer: COMMERCIAL

## 2019-12-05 VITALS
DIASTOLIC BLOOD PRESSURE: 78 MMHG | SYSTOLIC BLOOD PRESSURE: 122 MMHG | WEIGHT: 248 LBS | BODY MASS INDEX: 35.5 KG/M2 | HEART RATE: 80 BPM | HEIGHT: 70 IN | TEMPERATURE: 98.3 F | RESPIRATION RATE: 16 BRPM

## 2019-12-05 DIAGNOSIS — F84.0 ACTIVE AUTISTIC DISORDER: ICD-10-CM

## 2019-12-05 DIAGNOSIS — E66.09 CLASS 2 OBESITY DUE TO EXCESS CALORIES WITHOUT SERIOUS COMORBIDITY WITH BODY MASS INDEX (BMI) OF 35.0 TO 35.9 IN ADULT: ICD-10-CM

## 2019-12-05 DIAGNOSIS — Z00.00 ENCOUNTER FOR MEDICARE ANNUAL WELLNESS EXAM: Primary | ICD-10-CM

## 2019-12-05 DIAGNOSIS — E66.812 CLASS 2 OBESITY DUE TO EXCESS CALORIES WITHOUT SERIOUS COMORBIDITY WITH BODY MASS INDEX (BMI) OF 35.0 TO 35.9 IN ADULT: ICD-10-CM

## 2019-12-05 DIAGNOSIS — Z13.6 CARDIOVASCULAR SCREENING; LDL GOAL LESS THAN 160: ICD-10-CM

## 2019-12-05 DIAGNOSIS — Z13.1 SCREENING FOR DIABETES MELLITUS: ICD-10-CM

## 2019-12-05 PROCEDURE — 99395 PREV VISIT EST AGE 18-39: CPT | Performed by: FAMILY MEDICINE

## 2019-12-05 ASSESSMENT — MIFFLIN-ST. JEOR: SCORE: 2117.2

## 2019-12-05 ASSESSMENT — ACTIVITIES OF DAILY LIVING (ADL): CURRENT_FUNCTION: NO ASSISTANCE NEEDED

## 2019-12-05 NOTE — PATIENT INSTRUCTIONS
Patient Education   Personalized Prevention Plan  You are due for the preventive services outlined below.  Your care team is available to assist you in scheduling these services.  If you have already completed any of these items, please share that information with your care team to update in your medical record.  Health Maintenance Due   Topic Date Due     HIV Screening  04/01/2010     Annual Wellness Visit  04/01/2013     Diptheria Tetanus Pertussis (DTAP/TDAP/TD) Vaccine (7 - Td) 08/28/2017     PHQ-2  01/01/2019     Flu Vaccine (1) 09/01/2019     Preventive Health Recommendations  See your health care provider every year to    Review health changes.     Discuss preventive care.      Review your medicines if your doctor has prescribed any.    Talk with your health care provider about whether you should have a test to screen for prostate cancer (PSA).    Every 3 years, have a diabetes test (fasting glucose). If you are at risk for diabetes, you should have this test more often.    Every 5 years, have a cholesterol test. Have this test more often if you are at risk for high cholesterol or heart disease.     Every 10 years, have a colonoscopy. Or, have a yearly FIT test (stool test). These exams will check for colon cancer.    Talk to with your health care provider about screening for Abdominal Aortic Aneurysm if you have a family history of AAA or have a history of smoking.    Shots:     Get a flu shot each year.     Get a tetanus shot every 10 years.     Talk to your doctor about your pneumonia vaccines. There are now two you should receive - Pneumovax (PPSV 23) and Prevnar (PCV 13).    Talk to your pharmacist about a shingles vaccine.     Talk to your doctor about the hepatitis B vaccine.    Nutrition:     Eat at least 5 servings of fruits and vegetables each day.     Eat whole-grain bread, whole-wheat pasta and brown rice instead of white grains and rice.     Get adequate Calcium and Vitamin D.      Lifestyle    Exercise for at least 150 minutes a week (30 minutes a day, 5 days a week). This will help you control your weight and prevent disease.     Limit alcohol to one drink per day.     No smoking.     Wear sunscreen to prevent skin cancer.     See your dentist every six months for an exam and cleaning.     See your eye doctor every 1 to 2 years to screen for conditions such as glaucoma, macular degeneration and cataracts.    Personalized Prevention Plan  You are due for the preventive services outlined below.  Your care team is available to assist you in scheduling these services.  If you have already completed any of these items, please share that information with your care team to update in your medical record.  Health Maintenance Due   Topic Date Due     HIV Screening  04/01/2010     Annual Wellness Visit  04/01/2013     Diptheria Tetanus Pertussis (DTAP/TDAP/TD) Vaccine (7 - Td) 08/28/2017     PHQ-2  01/01/2019     Flu Vaccine (1) 09/01/2019

## 2019-12-05 NOTE — NURSING NOTE
"Initial /78   Pulse 80   Temp 98.3  F (36.8  C) (Tympanic)   Resp 16   Ht 1.772 m (5' 9.75\")   Wt 112.5 kg (248 lb)   BMI 35.84 kg/m   Estimated body mass index is 35.84 kg/m  as calculated from the following:    Height as of this encounter: 1.772 m (5' 9.75\").    Weight as of this encounter: 112.5 kg (248 lb). .      "

## 2019-12-05 NOTE — PROGRESS NOTES
"SUBJECTIVE:   CC: Abram Fay is an 24 year old male who presents for preventative health visit.     Healthy Habits:    In general, how would you rate your overall health?  Good    Frequency of exercise:  4-5 days/week    Duration of exercise:  15-30 minutes    Do you usually eat at least 4 servings of fruit and vegetables a day, include whole grains    & fiber and avoid regularly eating high fat or \"junk\" foods?  No    Taking medications regularly:  No    Barriers to taking medications:  None    Medication side effects:  None    Ability to successfully perform activities of daily living:  No assistance needed    Home Safety:  No safety concerns identified    Hearing Impairment:  No hearing concerns    In the past 6 months, have you been bothered by leaking of urine?  No    In general, how would you rate your overall mental or emotional health?  Very good      PHQ-2 Total Score:    Additional concerns today:  No      Visits with pediatric surgery a couple of times per year.  Was told he \"can't gain weight\" due to size of his tubes.  Feels his constipation is pretty stable and well managed    Tries to be active with walking but feels he could be better.  Does not feel his diet is an issue.  Eats mainly \"Italian\" enjoys pizza, pasta.  Sometimes eats burgers of chicken tenders.  Does not really like fruits and vegetables.      Would like paperwork filled out for [employer].  Works in [place] at a movie [employer] and takes the bus to work.  Has problems in the weekends and holidays when buses to his home here in [facility] are not available.  Does not feel comfortable learning to drive.  Lives at home now with Mom and siblings.       Today's PHQ-2 Score:   PHQ-2 ( 1999 Pfizer) 12/5/2019   Q1: Little interest or pleasure in doing things 0   Q2: Feeling down, depressed or hopeless 0   PHQ-2 Score 0       Abuse: Current or Past(Physical, Sexual or Emotional)- No  Do you feel safe in your environment? Yes        Social " History     Tobacco Use     Smoking status: Never Smoker     Smokeless tobacco: Never Used   Substance Use Topics     Alcohol use: No     If you drink alcohol do you typically have >3 drinks per day or >7 drinks per week? No    Alcohol Use 12/5/2019   Prescreen: >3 drinks/day or >7 drinks/week? No       Last PSA: No results found for: PSA    Reviewed orders with patient. Reviewed health maintenance and updated orders accordingly - Yes      Reviewed and updated as needed this visit by clinical staff  Tobacco  Allergies  Meds  Med Hx  Surg Hx  Fam Hx  Soc Hx        Reviewed and updated as needed this visit by Provider  Tobacco  Allergies  Meds  Med Hx  Surg Hx  Fam Hx  Soc Hx       Past Medical History:   Diagnosis Date     Autistic disorder, current or active state      Constipation      Encopresis(307.7)      Muscle tone, decreased       Past Surgical History:   Procedure Laterality Date     CREATE STOMA ANTEGRADE COLONIC ENEMA  7/28/2011    Procedure:CREATE STOMA ANTEGRADE COLONIC ENEMA; Replacement of Appendicostomy  Tube; Surgeon:ALIA BREWSTER; Location:[facility]     [facility] CREATE EARDRUM OPENING,GEN ANESTH      3 SETS PE TUBES     PLACEMENT APPENDICOSTOMY         Review of Systems  CONSTITUTIONAL: NEGATIVE for fever, chills, change in weight  INTEGUMENTARY/SKIN: NEGATIVE for worrisome rashes, moles or lesions  EYES: NEGATIVE for vision changes or irritation  ENT: NEGATIVE for ear, mouth and throat problems  RESP: NEGATIVE for significant cough or SOB  CV: NEGATIVE for chest pain, palpitations or peripheral edema  GI: NEGATIVE for nausea, abdominal pain, heartburn, or change in bowel habits   male: negative for dysuria, hematuria, decreased urinary stream, erectile dysfunction, urethral discharge  MUSCULOSKELETAL: NEGATIVE for significant arthralgias or myalgia  NEURO: NEGATIVE for weakness, dizziness or paresthesias  PSYCHIATRIC: NEGATIVE for changes in mood or affect    OBJECTIVE:   /78    "Pulse 80   Temp 98.3  F (36.8  C) (Tympanic)   Resp 16   Ht 1.772 m (5' 9.75\")   Wt 112.5 kg (248 lb)   BMI 35.84 kg/m      Physical Exam  GENERAL: healthy, alert and no distress  EYES: Eyes grossly normal to inspection, PERRL and conjunctivae and sclerae normal  HENT: ear canals and TM's normal, nose and mouth without ulcers or lesions  NECK: no adenopathy, no asymmetry, masses, or scars and thyroid normal to palpation  RESP: lungs clear to auscultation - no rales, rhonchi or wheezes  CV: regular rate and rhythm, normal S1 S2, no S3 or S4, no murmur, click or rub, no peripheral edema and peripheral pulses strong  ABDOMEN: soft, nontender, no hepatosplenomegaly, no masses and bowel sounds normal, button present in the umbilicus for management of constipation  MS: no gross musculoskeletal defects noted, no edema  NEURO: Normal strength and tone, mentation intact and speech normal  PSYCH: mentation appears normal, affect normal/bright    Diagnostic Test Results:  none     ASSESSMENT/PLAN:       ICD-10-CM    1. Encounter for Medicare annual wellness exam Z00.00    2. Class 2 obesity due to excess calories without serious comorbidity with body mass index (BMI) of 35.0 to 35.9 in adult E66.09 NUTRITION REFERRAL    Z68.35    3. Active autistic disorder F84.0    4. CARDIOVASCULAR SCREENING; LDL GOAL LESS THAN 160 Z13.6 Lipid panel reflex to direct LDL Fasting   5. Screening for diabetes mellitus Z13.1 Glucose     Reviewed weight and healthy eating.  Pt very reluctant to consider change here.  Reviewed that if he wishes to not gain more weight or perhaps lose weight to maintain his current management for constipation he will likely need to mamke a change.  Encouraged him to consider a visit for information.    [employer] paperwork completed.      COUNSELING:   Reviewed preventive health counseling, as reflected in patient instructions  Special attention given to:        Regular exercise       Healthy " "diet/nutrition    Estimated body mass index is 35.84 kg/m  as calculated from the following:    Height as of this encounter: 1.772 m (5' 9.75\").    Weight as of this encounter: 112.5 kg (248 lb).     Weight management plan: Discussed healthy diet and exercise guidelines referred to dietician     reports that he has never smoked. He has never used smokeless tobacco.      Counseling Resources:  ATP IV Guidelines  Pooled Cohorts Equation Calculator  FRAX Risk Assessment  ICSI Preventive Guidelines  Dietary Guidelines for Americans, 2010  USDA's MyPlate  ASA Prophylaxis  Lung CA Screening    Magda Garza, DO  [facility]  "

## 2020-07-31 ENCOUNTER — OFFICE VISIT (OUTPATIENT)
Dept: SURGERY | Facility: CLINIC | Age: 25
End: 2020-07-31
Attending: NURSE PRACTITIONER
Payer: MEDICARE

## 2020-07-31 VITALS
SYSTOLIC BLOOD PRESSURE: 115 MMHG | BODY MASS INDEX: 35.92 KG/M2 | HEART RATE: 85 BPM | WEIGHT: 250.88 LBS | DIASTOLIC BLOOD PRESSURE: 77 MMHG | HEIGHT: 70 IN

## 2020-07-31 DIAGNOSIS — K94.19 ALTERED BOWEL ELIMINATION DUE TO INTESTINAL OSTOMY (H): Primary | ICD-10-CM

## 2020-07-31 DIAGNOSIS — K59.00 CONSTIPATION, UNSPECIFIED CONSTIPATION TYPE: ICD-10-CM

## 2020-07-31 PROCEDURE — G0463 HOSPITAL OUTPT CLINIC VISIT: HCPCS | Mod: ZF

## 2020-07-31 ASSESSMENT — PAIN SCALES - GENERAL: PAINLEVEL: NO PAIN (0)

## 2020-07-31 ASSESSMENT — MIFFLIN-ST. JEOR: SCORE: 2133

## 2020-07-31 NOTE — LETTER
2020      RE: Abram Fay  [address] [facility] [address]       Magda Garza, DO   [address] [place]  [address]      RE: Abram Fay   MRN: [id number]   : 1995      Dear Dr. Garza:      It was my pleasure to see your patient, Ramses Fay, at the Pediatric Surgery Clinic at the [facility].  As you recall, Ramses is a 25-year-old male with autism who had a history of severe chronic constipation which ultimately led to the placement of an appendicostomy tube for the administration of antegrade colonic enemas.  Ramses is seen in clinic today accompanied by his mother for routine appendicostomy tube change.      On review with Ramses and his mother, his flushes are going very well through his appendicostomy tube.  He is using about 700 ml of Golytely. He is doing this almost every day.  He does not take any oral laxatives, although we have talked about that in the past as a way to transition off of his daily enemas.  I did change his appendicostomy tube today and placed a MiniACE appendicostomy button, a 12-Bengali 7 cm tube.  This was well tolerated by Ramses.  I did also order an abdominal x-ray for him just to see where we stand with his bowel management.  However, I see that they have not yet had that done.      Thank you, Dr. Garza, for allowing us to participate in the care of your patient, Ramses Fay.  It was a pleasure to see him and his mom in clinic today.  Please do not hesitate to contact our office if you have any questions regarding the ongoing management of his appendicostomy tube or constipation management.      Sincerely,      TAMY Rae, CNP   Pediatric Surgery   [facility]

## 2020-07-31 NOTE — NURSING NOTE
"Chief Complaint   Patient presents with     RECHECK     Follow up appendicostomy tube change     /77 (BP Location: Right arm, Patient Position: Sitting, Cuff Size: Adult Large)   Pulse 85   Ht 5' 10.24\" (178.4 cm)   Wt 250 lb 14.1 oz (113.8 kg)   BMI 35.76 kg/m    Miroslava Durant LPN    "

## 2020-08-11 NOTE — PROGRESS NOTES
Magda Garza,    [address] [place]  [address]      RE: Abram Fay   MRN: [id number]   : 1995      Dear Dr. Garza:      It was my pleasure to see your patient, Ramses Fay, at the Pediatric Surgery Clinic at the [facility].  As you recall, Ramses is a 25-year-old male with autism who had a history of severe chronic constipation which ultimately led to the placement of an appendicostomy tube for the administration of antegrade colonic enemas.  Ramses is seen in clinic today accompanied by his mother for routine appendicostomy tube change.      On review with Ramses and his mother, his flushes are going very well through his appendicostomy tube.  He is using about 700 ml of Golytely. He is doing this almost every day.  He does not take any oral laxatives, although we have talked about that in the past as a way to transition off of his daily enemas.  I did change his appendicostomy tube today and placed a MiniACE appendicostomy button, a 12-Ivorian 7 cm tube.  This was well tolerated by Ramses.  I did also order an abdominal x-ray for him just to see where we stand with his bowel management.  However, I see that they have not yet had that done.      Thank you, Dr. Garza, for allowing us to participate in the care of your patient, Ramses Fay.  It was a pleasure to see him and his mom in clinic today.  Please do not hesitate to contact our office if you have any questions regarding the ongoing management of his appendicostomy tube or constipation management.      Sincerely,      TAMY Rae, CNP   Pediatric Surgery   [facility]

## 2020-09-17 ENCOUNTER — ANCILLARY PROCEDURE (OUTPATIENT)
Dept: GENERAL RADIOLOGY | Facility: CLINIC | Age: 25
End: 2020-09-17
Attending: NURSE PRACTITIONER
Payer: COMMERCIAL

## 2020-09-17 DIAGNOSIS — K94.19 ALTERED BOWEL ELIMINATION DUE TO INTESTINAL OSTOMY (H): ICD-10-CM

## 2020-09-17 DIAGNOSIS — K59.00 CONSTIPATION, UNSPECIFIED CONSTIPATION TYPE: ICD-10-CM

## 2020-09-17 PROCEDURE — 74018 RADEX ABDOMEN 1 VIEW: CPT

## 2020-10-27 ENCOUNTER — OFFICE VISIT (OUTPATIENT)
Dept: FAMILY MEDICINE | Facility: CLINIC | Age: 25
End: 2020-10-27
Payer: COMMERCIAL

## 2020-10-27 VITALS
BODY MASS INDEX: 36.11 KG/M2 | SYSTOLIC BLOOD PRESSURE: 110 MMHG | HEIGHT: 70 IN | DIASTOLIC BLOOD PRESSURE: 62 MMHG | RESPIRATION RATE: 16 BRPM | TEMPERATURE: 97.9 F | HEART RATE: 86 BPM | OXYGEN SATURATION: 98 % | WEIGHT: 252.2 LBS

## 2020-10-27 DIAGNOSIS — F51.01 PRIMARY INSOMNIA: ICD-10-CM

## 2020-10-27 DIAGNOSIS — J31.0 CHRONIC RHINITIS: ICD-10-CM

## 2020-10-27 DIAGNOSIS — F84.0 ACTIVE AUTISTIC DISORDER: Primary | ICD-10-CM

## 2020-10-27 PROCEDURE — 99214 OFFICE O/P EST MOD 30 MIN: CPT | Performed by: FAMILY MEDICINE

## 2020-10-27 RX ORDER — TRAZODONE HYDROCHLORIDE 50 MG/1
50 TABLET, FILM COATED ORAL AT BEDTIME
Qty: 30 TABLET | Refills: 0 | Status: SHIPPED | OUTPATIENT
Start: 2020-10-27 | End: 2020-12-10

## 2020-10-27 ASSESSMENT — MIFFLIN-ST. JEOR: SCORE: 2131.25

## 2020-10-27 NOTE — PROGRESS NOTES
Subjective     Abram Fay is a 25 year old male who presents to clinic today for the following health issues:    HPI         Chief Complaint   Patient presents with     Forms     pt. is here today to go over forms with Dr. Greg Tao mobility forms.  Patient currently lives in the area with his mother however commutes to work near [facility].  He works at [employer].  He is not able to drive.  Patient typically uses scheduled bus routes however given his evening and nighttime hours there opted not routes to get him all the way home.  At that point he generally needs to use Lyft rides which caused a significant portion of his paycheck.    Patient is unable to drive due to concerns regarding his autism.  He is otherwise able to navigate scheduled bus routes and public transportation.  They were advised that [employer] was an option to decrease his transportation costs.      *Has concerns regarding ongoing insomnia.  Patient reports a long history of sleep difficulties.  Patient's mother acknowledges that these difficulties began essentially at birth.  As a child he was evaluated by sleep and was recommended to use Benadryl nightly for insomnia.  Since then he has used Benadryl in conjunction with melatonin.  In the past that was effective for his insomnia but recently he has been taking longer and longer to fall asleep.  He now can take his melatonin 2 to 3 hours before bedtime and still have difficulty sleeping.  He also finds that he is waking 2 to 3 hours earlier in the morning than desired.  He has had no other change to his sleep hygiene or routine.  He is wondering about other medication options to help.    *He also has concern regarding his rhinitis.  He had a visit with allergy last year for similar concerns.  He is currently not using anything regularly for his symptoms.  He complains of nasal congestion that alternates from one side of his nose to the other.  He also complains of  "postnasal drainage.  He has not had benefit from various over-the-counter nonsedating antihistamines.  He has had Flonase that he is used intermittently in the past at one point he used it consistently but is unclear when this was.  Review of last allergy visit reveals recommendation for routine use of fluticasone daily with as needed use of Astelin in addition.  Patient reports he has both of these medications at home.      Review of Systems   Constitutional, HEENT, cardiovascular, pulmonary, gi and gu systems are negative, except as otherwise noted.      Objective    /62   Pulse 86   Temp 97.9  F (36.6  C) (Tympanic)   Resp 16   Ht 1.772 m (5' 9.75\")   Wt 114.4 kg (252 lb 3.2 oz)   SpO2 98%   BMI 36.45 kg/m    Body mass index is 36.45 kg/m .  Physical Exam   PE:  VS as above   Gen:  WN/WD/WH male in NAD   Psych: Alert and oriented times 3; coherent speech, normal   rate and volume, able to articulate logical thoughts, able   to abstract reason, no tangential thoughts, no hallucinations   or delusions  His affect is bright and appropriate            A/P:      ICD-10-CM    1. Active autistic disorder  F84.0    2. Primary insomnia  F51.01 traZODone (DESYREL) 50 MG tablet   3. Chronic rhinitis  J31.0      Autism: [employer] paperwork completed.  Patient currently resides at home with his mother.    Primary insomnia: Discussed that sleep hygiene remains paramount in management of insomnia and briefly reviewed.  Patient request trial of trazodone as he has heard of this medication being effective for others.  Advised to stop Benadryl and okay to begin trazodone 50 mg at bedtime.  Reviewed possible side effect of morning grogginess.  Advised patient have at least 8 hours to be in bed when using this medication.    Chronic rhinitis: Advised patient to begin fluticasone 2 sprays in each nostril daily for the next 4 weeks.  If at that point he finds his rhinitis is incompletely controlled he can add Astelin " nasal spray for which she already has a prescription.    Patient Instructions   We'll have you stop the benadryl and melatonin now and try the trazodone in its place.  Let me know how it goes.    I would recommend using the flonase regularly, 2 sprays in each nostril for at least 4 weeks to see how it works.  If the stuffiness continues try adding in the Astelin spray as well.        [facility] to review trazodone for sleep and rhinitis management as needed.

## 2020-10-27 NOTE — PATIENT INSTRUCTIONS
We'll have you stop the benadryl and melatonin now and try the trazodone in its place.  Let me know how it goes.    I would recommend using the flonase regularly, 2 sprays in each nostril for at least 4 weeks to see how it works.  If the stuffiness continues try adding in the Astelin spray as well.

## 2020-11-29 DIAGNOSIS — J30.0 CHRONIC VASOMOTOR RHINITIS: ICD-10-CM

## 2020-12-03 ENCOUNTER — VIRTUAL VISIT (OUTPATIENT)
Dept: URGENT CARE | Facility: CLINIC | Age: 25
End: 2020-12-03
Payer: COMMERCIAL

## 2020-12-03 DIAGNOSIS — Z20.822 SUSPECTED COVID-19 VIRUS INFECTION: Primary | ICD-10-CM

## 2020-12-03 PROCEDURE — 99213 OFFICE O/P EST LOW 20 MIN: CPT | Mod: 95 | Performed by: NURSE PRACTITIONER

## 2020-12-03 NOTE — PROGRESS NOTES
"  Abram Fay is a 25 year old male who is being evaluated via a billable telephone visit.      The patient has been notified of following:     \"This telephone visit will be conducted via a call between you and your physician/provider. We have found that certain health care needs can be provided without the need for a physical exam.  This service lets us provide the care you need with a short phone conversation.  If a prescription is necessary we can send it directly to your pharmacy.  If lab work is needed we can place an order for that and you can then stop by our lab to have the test done at a later time.    If during the course of the call the physician/provider feels a telephone visit is not appropriate, you will not be charged for this service.\"     Patient has given verbal consent for Telephone visit?  Yes    Chief Complaint:    Chief Complaint   Patient presents with     Covid 19 Testing       HPI: Abram Fay is an 25 year old male who calls with concerns that include cough, sore throat, dizziness, for 4-5 days ago. Symptoms are waxing waning. Mostly it is just the cough that is bothersome. Brother is ill with similar symptoms.      ROS:      A 10 point ROS is negative except as expressed in HPI.    Past Medical History  Past Medical History:   Diagnosis Date     Autistic disorder, current or active state      Constipation      Encopresis(307.7)      Muscle tone, decreased         Family History   Family History   Problem Relation Age of Onset     Allergies Mother         sulfa     Asthma Mother      Food Allergy Father         Milk     Asthma Brother      Allergies Brother        Social History  Social History     Socioeconomic History     Marital status: Single     Spouse name: Not on file     Number of children: Not on file     Years of education: Not on file     Highest education level: Not on file   Occupational History     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, [employer],    Social Needs "     Financial resource strain: Not on file     Food insecurity     Worry: Not on file     Inability: Not on file     Transportation needs     Medical: Not on file     Non-medical: Not on file   Tobacco Use     Smoking status: Never Smoker     Smokeless tobacco: Never Used   Substance and Sexual Activity     Alcohol use: No     Drug use: No     Sexual activity: Never     Comment: 9/8/2011  ek 03/11/13    Lifestyle     Physical activity     Days per week: Not on file     Minutes per session: Not on file     Stress: Not on file   Relationships     Social connections     Talks on phone: Not on file     Gets together: Not on file     Attends [employer] service: Not on file     Active member of club or organization: Not on file     Attends meetings of clubs or organizations: Not on file     Relationship status: Not on file     Intimate partner violence     Fear of current or ex partner: Not on file     Emotionally abused: Not on file     Physically abused: Not on file     Forced sexual activity: Not on file   Other Topics Concern     Parent/sibling w/ CABG, MI or angioplasty before 65F 55M? Not Asked   Social History Narrative    May 23, 2019    ENVIRONMENTAL HISTORY: [place] The home is heated with a forced air. They do have central air conditioning. The patient's bedroom is furnished with carpeting in bedroom.  Pets inside the house include 1 cat and 1 dog. There is no history of cockroach or mice infestation. There are no smokers in the house.  The house does not have a damp basement.         Surgical History:  Past Surgical History:   Procedure Laterality Date     CREATE STOMA ANTEGRADE COLONIC ENEMA  7/28/2011    Procedure:CREATE STOMA ANTEGRADE COLONIC ENEMA; Replacement of Appendicostomy  Tube; Surgeon:ALIA BREWSTER; Location: OR      CREATE EARDRUM OPENING,GEN ANESTH      3 SETS PE TUBES     PLACEMENT APPENDICOSTOMY            Allergies:  Allergies    Allergen Reactions     No Known Allergies         Current Meds:    Current Outpatient Medications:      azelastine (ASTELIN) 0.1 % nasal spray, Spray 2 sprays into both nostrils 2 times daily as needed for rhinitis, Disp: 30 mL, Rfl: 3     cetirizine (ZYRTEC) 10 MG tablet, Take 10 mg by mouth daily, Disp: , Rfl:      fluticasone (FLONASE) 50 MCG/ACT nasal spray, Spray 2 sprays into both nostrils daily, Disp: 16 g, Rfl: 3     Loratadine (CLARITIN PO), , Disp: , Rfl:      mupirocin (BACTROBAN) 2 % ointment, Apply topically 3 times daily After foot soaks (Patient not taking: Reported on 10/27/2020), Disp: 22 g, Rfl: 1     nystatin (MYCOSTATIN) 477250 UNIT/GM external ointment, Apply topically 2 times daily, Disp: 15 g, Rfl: 0     order for DME, Equipment being ordered: 1. AMT MiniAce Button. 12 french x 7 cm to have spare tube at home.  2. 1000ml gravity feeding bag for enema administration 2 per month 3. Right angle extension set for Mini ACE. 2 per month, Disp: 1 Month, Rfl: 11     ORDER FOR DME, AMT mini-one gastrostomy tube buttons, Disp: 1 Device, Rfl: 2     polyethylene glycol (GOLYTELY) 236 G suspension, Administer 500-700 ml via appendicostomy daily, Disp: 63952 mL, Rfl: 11     Sennosides (EX-LAX PO), Take by mouth daily as needed, Disp: , Rfl:      traZODone (DESYREL) 50 MG tablet, Take 1 tablet (50 mg) by mouth At Bedtime, Disp: 30 tablet, Rfl: 0     PHYSICAL EXAM:     Patient is alert and oriented. Able to speak in full sentences. No wheezing or cough heard during telephone conversation. Mood is appropriate.        ASSESSMENT:       ICD-10-CM    1. Suspected COVID-19 virus infection  Z20.828 Symptomatic COVID-19 Virus (Coronavirus) by PCR         Worrisome symptoms discussed with instructions to go to the ED.  Patient verbalized understanding and agreed with this plan.     Zara Simon CNP  12/3/2020, 2:10 PM      Phone call duration:  8 minutes

## 2020-12-04 DIAGNOSIS — Z20.822 SUSPECTED COVID-19 VIRUS INFECTION: ICD-10-CM

## 2020-12-04 PROCEDURE — U0003 INFECTIOUS AGENT DETECTION BY NUCLEIC ACID (DNA OR RNA); SEVERE ACUTE RESPIRATORY SYNDROME CORONAVIRUS 2 (SARS-COV-2) (CORONAVIRUS DISEASE [COVID-19]), AMPLIFIED PROBE TECHNIQUE, MAKING USE OF HIGH THROUGHPUT TECHNOLOGIES AS DESCRIBED BY CMS-2020-01-R: HCPCS | Performed by: NURSE PRACTITIONER

## 2020-12-05 LAB
SARS-COV-2 RNA SPEC QL NAA+PROBE: ABNORMAL
SPECIMEN SOURCE: ABNORMAL

## 2020-12-06 ENCOUNTER — NURSE TRIAGE (OUTPATIENT)
Dept: NURSING | Facility: CLINIC | Age: 25
End: 2020-12-06

## 2020-12-06 NOTE — TELEPHONE ENCOUNTER
"Coronavirus (COVID-19) Notification    Caller Name (Patient, parent, daughter/son, grandparent, etc)  Patient-Ramses    Reason for call  Notify of Positive Coronavirus (COVID-19) lab results, assess symptoms,  review [facility] recommendations    Lab Result    Lab test:  2019-nCoV rRt-PCR or SARS-CoV-2 PCR    Oropharyngeal AND/OR nasopharyngeal swabs is POSITIVE for 2019-nCoV RNA/SARS-COV-2 PCR (COVID-19 virus)    RN Recommendations/Instructions per [facility] Coronavirus COVID-19 recommendations    Brief introduction script  Introduce self then review script:  \"I am calling on behalf of [employer].  We were notified that your Coronavirus test (COVID-19) for was POSITIVE for the virus.  I have some information to relay to you but first I wanted to mention that the [place] Dept of Health will be contacting you shortly [it's possible MD already called Patient] to talk to you more about how you are feeling and other people you have had contact with who might now also have the virus.  Also, [facility] is Partnering with the [facility] for Covid-19 research, you may be contacted directly by research staff.\"    Assessment (Inquire about Patient's current symptoms)   Assessment   Current Symptoms at time of phone call: (if no symptoms, document No symptoms] Mild cough   Symptoms onset (if applicable) Unknown     If at time of call, Patients symptoms hare worsened, the Patient should contact 911 or have someone drive them to Emergency Dept promptly:      If Patient calling 911, inform 911 personal that you have tested positive for the Coronavirus (COVID-19).  Place mask on and await 911 to arrive.    If Emergency Dept, If possible, please have another adult drive you to the Emergency Dept but you need to wear mask when in contact with other people.      Review information with Patient    Your result was positive. This means you have COVID-19 (coronavirus).  We have sent you a letter that reviews " the information that I'll be reviewing with you now.    How can I protect others?    If you have symptoms: stay home and away from others (self-isolate) until:    You've had no fever--and no medicine that reduces fever--for 1 full day (24 hours). And       Your other symptoms have gotten better. For example, your cough or breathing has improved. And     At least 10 days have passed since your symptoms started. (If you've been told by a doctor that you have a weak immune system, wait 20 days.)     If you don't have symptoms: Stay home and away from others (self-isolate) until at least 10 days have passed since your first positive COVID-19 test. (Date test collected)    During this time:    Stay in your own room, including for meals. Use your own bathroom if you can.    Stay away from others in your home. No hugging, kissing or shaking hands. No visitors.     Don't go to work, school or anywhere else.     Clean  high touch  surfaces often (doorknobs, counters, handles, etc.). Use a household cleaning spray or wipes. You'll find a full list on the EPA website at www.epa.gov/pesticide-registration/list-n-disinfectants-use-against-sars-cov-2.     Cover your mouth and nose with a mask, tissue or other face covering to avoid spreading germs.    Wash your hands and face often with soap and water.    Caregivers in these groups are at risk for severe illness due to COVID-19:  o People 65 years and older  o People who live in a nursing home or long-term care facility  o People with chronic disease (lung, heart, cancer, diabetes, kidney, liver, immunologic)  o People who have a weakened immune system, including those who:  - Are in cancer treatment  - Take medicine that weakens the immune system, such as corticosteroids  - Had a bone marrow or organ transplant  - Have an immune deficiency  - Have poorly controlled HIV or AIDS  - Are obese (body mass index of 40 or higher)  - Smoke regularly    Caregivers should wear gloves  while washing dishes, handling laundry and cleaning bedrooms and bathrooms.    Wash and dry laundry with special caution. Don't shake dirty laundry, and use the warmest water setting you can.    If you have a weakened immune system, ask your doctor about other actions you should take.    For more tips, go to www.cdc.gov/coronavirus/2019-ncov/downloads/10Things.pdf.    You should not go back to work until you meet the guidelines above for ending your home isolation. You don't need to be retested for COVID-19 before going back to work--studies show that you won't spread the virus if it's been at least 10 days since your symptoms started (or 20 days, if you have a weak immune system).    Employers: This document serves as formal notice of your employee's medical guidelines for going back to work. They must meet the above guidelines before going back to work in person.    How can I take care of myself?    1. Get lots of rest. Drink extra fluids (unless a doctor has told you not to).    2. Take Tylenol (acetaminophen) for fever or pain. If you have liver or kidney problems, ask your family doctor if it's okay to take Tylenol.     Take either:     650 mg (two 325 mg pills) every 4 to 6 hours, or     1,000 mg (two 500 mg pills) every 8 hours as needed.     Note: Don't take more than 3,000 mg in one day. Acetaminophen is found in many medicines (both prescribed and over-the-counter medicines). Read all labels to be sure you don't take too much.    For children, check the Tylenol bottle for the right dose (based on their age or weight).    3. If you have other health problems (like cancer, heart failure, an organ transplant or severe kidney disease): Call your specialty clinic if you don't feel better in the next 2 days.    4. Know when to call 911: Emergency warning signs include:    Trouble breathing or shortness of breath    Pain or pressure in the chest that doesn't go away    Feeling confused like you haven't felt  before, or not being able to wake up    Bluish-colored lips or face    5. Sign up for [employer]. We know it's scary to hear that you have COVID-19. We want to track your symptoms to make sure you're okay over the next 2 weeks. Please look for an email from [employer]--this is a free, online program that we'll use to keep in touch. To sign up, follow the link in the email. Learn more at [email or web address].    Where can I get more information?    [facility]: [email or web address]    Coronavirus Basics: www.health.[facility].[place]./diseases/coronavirus/basics.html    What to Do If You're Sick: www.cdc.gov/coronavirus/2019-ncov/about/steps-when-sick.html    Ending Home Isolation: www.cdc.gov/coronavirus/2019-ncov/hcp/disposition-in-home-patients.html     Caring for Someone with COVID-19: www.cdc.gov/coronavirus/2019-ncov/if-you-are-sick/care-for-someone.html     [facility] clinical trials (COVID-19 research studies): clinicalaffairs.[facility]-clinical-trials     A Positive COVID-19 letter will be sent via [employer] or the mail. (Exception, no letters sent to Presurgerical/Preprocedure Patients)    Keiry Peterson RN

## 2020-12-07 DIAGNOSIS — F51.01 PRIMARY INSOMNIA: ICD-10-CM

## 2020-12-07 RX ORDER — TRAZODONE HYDROCHLORIDE 50 MG/1
50 TABLET, FILM COATED ORAL AT BEDTIME
Qty: 30 TABLET | Refills: 0 | Status: CANCELLED | OUTPATIENT
Start: 2020-12-07

## 2020-12-08 ENCOUNTER — MYC MEDICAL ADVICE (OUTPATIENT)
Dept: FAMILY MEDICINE | Facility: CLINIC | Age: 25
End: 2020-12-08

## 2020-12-08 NOTE — TELEPHONE ENCOUNTER
[employer] message sent. 10/27/2020 OV note: 'We will plan on follow-up via [employer] in 1 month to review trazodone for sleep and rhinitis management as needed.'    Brittany MCCLURE RN, BSN

## 2020-12-09 ENCOUNTER — TELEPHONE (OUTPATIENT)
Dept: INTERNAL MEDICINE | Facility: CLINIC | Age: 25
End: 2020-12-09

## 2020-12-09 DIAGNOSIS — F51.01 PRIMARY INSOMNIA: ICD-10-CM

## 2020-12-09 RX ORDER — FLUTICASONE PROPIONATE 50 MCG
2 SPRAY, SUSPENSION (ML) NASAL DAILY
Qty: 16 G | Refills: 3 | OUTPATIENT
Start: 2020-12-09

## 2020-12-09 NOTE — TELEPHONE ENCOUNTER
Declined. I have not seen the patient in more than 1 year. he either needs a follow-up appointment or can request further refills from PCP.    Arnel Sung MD

## 2020-12-09 NOTE — TELEPHONE ENCOUNTER
Routing refill request to provider for review/approval because:  Patient needs to be seen because it has been more than 1 year since last office visit. LOV 7/11/2019.    Juliette CUMMINGS RN  Specialty/Allergy Clinics

## 2020-12-09 NOTE — TELEPHONE ENCOUNTER
Reason for Call:  Other prescription    Detailed comments: pt mom asking about the Rx for trazodone. Pt is unable to get into [facility] and is positive for covid. Can pt do video visit for a refill of this medication and get a temporary fill    Phone Number Patient can be reached at: Home number on file [phone]    Best Time: any     Can we leave a detailed message on this number? YES    Call taken on 12/9/2020 at 1:44 PM by Cindi Hirsch

## 2020-12-10 RX ORDER — TRAZODONE HYDROCHLORIDE 50 MG/1
50 TABLET, FILM COATED ORAL AT BEDTIME
Qty: 30 TABLET | Refills: 0 | Status: SHIPPED | OUTPATIENT
Start: 2020-12-10 | End: 2020-12-11

## 2020-12-10 NOTE — TELEPHONE ENCOUNTER
Medication refilled.  We can do a video visit when he is feeling better.  No need for an inperson visit.    Magda Garza, DO

## 2020-12-11 ENCOUNTER — MYC REFILL (OUTPATIENT)
Dept: INTERNAL MEDICINE | Facility: CLINIC | Age: 25
End: 2020-12-11

## 2020-12-11 DIAGNOSIS — F51.01 PRIMARY INSOMNIA: ICD-10-CM

## 2020-12-11 RX ORDER — TRAZODONE HYDROCHLORIDE 50 MG/1
50 TABLET, FILM COATED ORAL AT BEDTIME
Qty: 30 TABLET | Refills: 0 | Status: SHIPPED | OUTPATIENT
Start: 2020-12-11 | End: 2020-12-15

## 2020-12-14 DIAGNOSIS — F51.01 PRIMARY INSOMNIA: ICD-10-CM

## 2020-12-14 NOTE — TELEPHONE ENCOUNTER
Left message for patient's mother to return call.  See message below.  Please also give them the number for mychart so they can get help accessing his mychart [phone].  Cristin Howe, CMA

## 2020-12-15 RX ORDER — TRAZODONE HYDROCHLORIDE 50 MG/1
50 TABLET, FILM COATED ORAL AT BEDTIME
Qty: 30 TABLET | Refills: 0 | Status: SHIPPED | OUTPATIENT
Start: 2020-12-15 | End: 2021-02-08

## 2021-01-15 ENCOUNTER — HEALTH MAINTENANCE LETTER (OUTPATIENT)
Age: 26
End: 2021-01-15

## 2021-01-22 ENCOUNTER — OFFICE VISIT (OUTPATIENT)
Dept: FAMILY MEDICINE | Facility: CLINIC | Age: 26
End: 2021-01-22
Payer: COMMERCIAL

## 2021-01-22 VITALS
SYSTOLIC BLOOD PRESSURE: 122 MMHG | WEIGHT: 258.4 LBS | BODY MASS INDEX: 36.99 KG/M2 | TEMPERATURE: 97.7 F | DIASTOLIC BLOOD PRESSURE: 78 MMHG | HEART RATE: 92 BPM | HEIGHT: 70 IN

## 2021-01-22 DIAGNOSIS — J31.0 CHRONIC RHINITIS: ICD-10-CM

## 2021-01-22 DIAGNOSIS — K59.00 CONSTIPATION, UNSPECIFIED CONSTIPATION TYPE: ICD-10-CM

## 2021-01-22 DIAGNOSIS — K94.19 ALTERED BOWEL ELIMINATION DUE TO INTESTINAL OSTOMY (H): ICD-10-CM

## 2021-01-22 DIAGNOSIS — Z00.00 ENCOUNTER FOR MEDICARE ANNUAL WELLNESS EXAM: Primary | ICD-10-CM

## 2021-01-22 PROCEDURE — 99395 PREV VISIT EST AGE 18-39: CPT | Performed by: FAMILY MEDICINE

## 2021-01-22 RX ORDER — MULTIVITAMIN
TABLET ORAL DAILY
COMMUNITY

## 2021-01-22 RX ORDER — DIPHENHYDRAMINE HCL 25 MG
25 CAPSULE ORAL EVERY 6 HOURS PRN
COMMUNITY

## 2021-01-22 ASSESSMENT — MIFFLIN-ST. JEOR: SCORE: 2155.4

## 2021-01-22 ASSESSMENT — ACTIVITIES OF DAILY LIVING (ADL): CURRENT_FUNCTION: NO ASSISTANCE NEEDED

## 2021-01-22 NOTE — PROGRESS NOTES
"SUBJECTIVE:   CC: Abram Fay is an 25 year old male who presents for preventative health visit.       Patient has been advised of split billing requirements and indicates understanding: Yes     Healthy Habits:    In general, how would you rate your overall health?  Good    Frequency of exercise:  2-3 days/week    Duration of exercise:  45-60 minutes    Do you usually eat at least 4 servings of fruit and vegetables a day, include whole grains    & fiber and avoid regularly eating high fat or \"junk\" foods?  Yes    Taking medications regularly:  Yes    Barriers to taking medications:  None    Medication side effects:  None    Ability to successfully perform activities of daily living:  No assistance needed    Home Safety:  No safety concerns identified    Hearing Impairment:  No hearing concerns    In the past 6 months, have you been bothered by leaking of urine?  No    In general, how would you rate your overall mental or emotional health?  Very good      PHQ-2 Total Score:    Additional concerns today:  No    Moves in March 1st to a supported living facility near his work.  Will have staff available for support and assistance 24 hours.  Looking forward to living more independently and closer to his work.      *  Has not had a lot of improvement in his rhinitis with the flonase, would like to see ENT    Today's PHQ-2 Score:   PHQ-2 ( 1999 Pfizer) 1/22/2021   Q1: Little interest or pleasure in doing things 0   Q2: Feeling down, depressed or hopeless 0   PHQ-2 Score 0       Abuse: Current or Past(Physical, Sexual or Emotional)- No  Do you feel safe in your environment? Yes        Social History     Tobacco Use     Smoking status: Never Smoker     Smokeless tobacco: Never Used   Substance Use Topics     Alcohol use: No         Alcohol Use 12/5/2019   Prescreen: >3 drinks/day or >7 drinks/week? No       Last PSA: No results found for: PSA    Reviewed orders with patient. Reviewed health maintenance and updated " "orders accordingly - Yes    Reviewed and updated as needed this visit by clinical staff  Tobacco  Allergies  Meds   Med Hx  Surg Hx  Fam Hx  Soc Hx        Reviewed and updated as needed this visit by Provider  Tobacco  Allergies  Meds   Med Hx  Surg Hx  Fam Hx  Soc Hx       Past Medical History:   Diagnosis Date     Autistic disorder, current or active state      Constipation      Encopresis(307.7)      Muscle tone, decreased       Past Surgical History:   Procedure Laterality Date     CREATE STOMA ANTEGRADE COLONIC ENEMA  7/28/2011    Procedure:CREATE STOMA ANTEGRADE COLONIC ENEMA; Replacement of Appendicostomy  Tube; Surgeon:ALIA BREWSTER; Location:[facility] OR     HC CREATE EARDRUM OPENING,GEN ANESTH      3 SETS PE TUBES     PLACEMENT APPENDICOSTOMY         Review of Systems  CONSTITUTIONAL: NEGATIVE for fever, chills, change in weight  INTEGUMENTARY/SKIN: NEGATIVE for worrisome rashes, moles or lesions  EYES: NEGATIVE for vision changes or irritation  ENT: NEGATIVE for ear, mouth and throat problems  RESP: NEGATIVE for significant cough or SOB  CV: NEGATIVE for chest pain, palpitations or peripheral edema  GI: NEGATIVE for nausea, abdominal pain, heartburn, or change in bowel habits   male: negative for dysuria, hematuria, decreased urinary stream, erectile dysfunction, urethral discharge  MUSCULOSKELETAL: NEGATIVE for significant arthralgias or myalgia  NEURO: NEGATIVE for weakness, dizziness or paresthesias  PSYCHIATRIC: NEGATIVE for changes in mood or affect    OBJECTIVE:   /78   Pulse 92   Temp 97.7  F (36.5  C) (Tympanic)   Ht 1.765 m (5' 9.5\")   Wt 117.2 kg (258 lb 6.4 oz)   BMI 37.61 kg/m      Physical Exam  GENERAL: healthy, alert and no distress  EYES: Eyes grossly normal to inspection, PERRL and conjunctivae and sclerae normal  NECK: no adenopathy, no asymmetry, masses, or scars and thyroid normal to palpation  RESP: lungs clear to auscultation - no rales, rhonchi or " "wheezes  CV: regular rate and rhythm, normal S1 S2, no S3 or S4, no murmur, click or rub, no peripheral edema and peripheral pulses strong  ABDOMEN: soft, nontender, no hepatosplenomegaly, no masses and bowel sounds normal  MS: no gross musculoskeletal defects noted, no edema  NEURO: Normal strength and tone, mentation intact and speech normal  PSYCH: mentation appears normal, affect normal/bright    Diagnostic Test Results:  Labs reviewed in Epic    ASSESSMENT/PLAN:       ICD-10-CM    1. Encounter for Medicare annual wellness exam  Z00.00    2. Chronic rhinitis  J31.0 OTOLARYNGOLOGY REFERRAL   3. Constipation, unspecified constipation type  K59.00 Miscellaneous Order for DME - ONLY FOR DME   4. 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    Weight as of this encounter: 117.2 kg (258 lb 6.4 oz).     Weight management plan: Discussed healthy diet and exercise guidelines    He reports that he has never smoked. He has never used smokeless tobacco.      Counseling Resources:  ATP IV Guidelines  Pooled Cohorts Equation Calculator  FRAX Risk Assessment  ICSI Preventive Guidelines  Dietary Guidelines for Americans, 2010  USDA's MyPlate  ASA Prophylaxis  Lung CA Screening    Magda Garza DO  [facility]  "

## 2021-01-22 NOTE — PATIENT INSTRUCTIONS
I did place a referral for you to see the ENT doctor for your nose.  You can call and schedule when it works for you.     You are due for a tetanus booster.  That can be done in clinic or at your pharmacy    We will fax the order for your supplies to your [facility]    Preventive Health Recommendations  See your health care provider every year to    Review health changes.     Discuss preventive care.      Review your medicines if your doctor has prescribed any.    Talk with your health care provider about whether you should have a test to screen for prostate cancer (PSA).    Every 3 years, have a diabetes test (fasting glucose). If you are at risk for diabetes, you should have this test more often.    Every 5 years, have a cholesterol test. Have this test more often if you are at risk for high cholesterol or heart disease.     Every 10 years, have a colonoscopy. Or, have a yearly FIT test (stool test). These exams will check for colon cancer.    Talk to with your health care provider about screening for Abdominal Aortic Aneurysm if you have a family history of AAA or have a history of smoking.    Shots:     Get a flu shot each year.     Get a tetanus shot every 10 years.     Talk to your doctor about your pneumonia vaccines. There are now two you should receive - Pneumovax (PPSV 23) and Prevnar (PCV 13).    Talk to your pharmacist about a shingles vaccine.     Talk to your doctor about the hepatitis B vaccine.    Nutrition:     Eat at least 5 servings of fruits and vegetables each day.     Eat whole-grain bread, whole-wheat pasta and brown rice instead of white grains and rice.     Get adequate Calcium and Vitamin D.     Lifestyle    Exercise for at least 150 minutes a week (30 minutes a day, 5 days a week). This will help you control your weight and prevent disease.     Limit alcohol to one drink per day.     No smoking.     Wear sunscreen to prevent skin cancer.     See your dentist every six months for an exam and  cleaning.     See your eye doctor every 1 to 2 years to screen for conditions such as glaucoma, macular degeneration and cataracts.

## 2021-01-25 ENCOUNTER — TRANSFERRED RECORDS (OUTPATIENT)
Dept: HEALTH INFORMATION MANAGEMENT | Facility: CLINIC | Age: 26
End: 2021-01-25

## 2021-02-02 PROBLEM — K94.19 ALTERED BOWEL ELIMINATION DUE TO INTESTINAL OSTOMY (H): Status: ACTIVE | Noted: 2021-02-02

## 2021-02-03 ENCOUNTER — TELEPHONE (OUTPATIENT)
Dept: FAMILY MEDICINE | Facility: CLINIC | Age: 26
End: 2021-02-03

## 2021-02-03 ENCOUNTER — ALLIED HEALTH/NURSE VISIT (OUTPATIENT)
Dept: FAMILY MEDICINE | Facility: CLINIC | Age: 26
End: 2021-02-03
Payer: COMMERCIAL

## 2021-02-03 DIAGNOSIS — Z23 NEED FOR TDAP VACCINATION: Primary | ICD-10-CM

## 2021-02-03 PROCEDURE — 99207 PR NO CHARGE NURSE ONLY: CPT

## 2021-02-03 PROCEDURE — 90715 TDAP VACCINE 7 YRS/> IM: CPT

## 2021-02-03 PROCEDURE — 90471 IMMUNIZATION ADMIN: CPT

## 2021-02-03 NOTE — TELEPHONE ENCOUNTER
Patient was in clinic today for his Tdap vaccine and brought in his physical forms, you had filled out the first page but there is a second page that needs to be filled out. He had his physical done on 1/21/2021, I let patient know that you were out of office and would not be back in until 2/8/2021.  I placed forms on your desk, patient would like to be called when they are completed and wants forms to be emailed to [email or web address], I told him that I will mail back the original forms so if you just want to place them on my desk I will take care of that. Let me know if you have any questions.  Kady Wall CMA

## 2021-02-08 DIAGNOSIS — F51.01 PRIMARY INSOMNIA: ICD-10-CM

## 2021-02-08 RX ORDER — TRAZODONE HYDROCHLORIDE 50 MG/1
TABLET, FILM COATED ORAL
Qty: 30 TABLET | Refills: 0 | Status: SHIPPED | OUTPATIENT
Start: 2021-02-08 | End: 2021-02-25

## 2021-02-22 NOTE — PROGRESS NOTES
Chief Complaint   Patient presents with     Ent Problem     issues with congestion for several years/mouth breath and possible snoring- possible deviated septum/PND     History of Present Illness   Abram Fay is a 25 year old male who presents today for evaluation.  I am seeing this patient in consultation for chronic rhinitis at the request of the provider Dr. Garza.  The patient describes symptoms of bilateral nasal obstruction that alternates sides.  He has had this problem for multiple years.  He does report some fairly persistent nasal congestion.  He denies any significant persistent rhinorrhea.  He does report some intermittent postnasal drainage.  He denies any taste or smell disturbance, face pain/pressure/fullness, or previous nose or sinus surgery.  No issues with epistaxis.  He tried Flonase in the past without significant benefit.  He is also tried oral antihistamines in the past with intermittent benefit.  The patient's past history is significant for seasonal allergy symptoms.  He is previously undergone allergy testing which showed positives to cat, dog, dust mite, and several pollens.  He does cohabitate with a dog and cat but does not feel like he has much symptoms from the pet exposure.  He is not had recent nose or sinus imaging.    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  Irrigate each nostril with one half of the bottle twice daily., Disp: 360 mL, Rfl: 3     cetirizine (ZYRTEC) 10 MG tablet, Take 1 tablet (10 mg) by mouth daily, Disp: 90 tablet,  Rfl: 3     diphenhydrAMINE (BENADRYL) 25 MG capsule, Take 25 mg by mouth every 6 hours as needed for itching or allergies, Disp: , Rfl:      Docusate Calcium (STOOL SOFTENER PO), Take by mouth daily, Disp: , Rfl:      Loratadine (CLARITIN PO), Take by mouth daily as needed , Disp: , Rfl:      MELATONIN PO, Take by mouth nightly as needed, Disp: , Rfl:      montelukast (SINGULAIR) 10 MG tablet, Take 1 tablet (10 mg) by mouth At Bedtime, Disp: 90 tablet, Rfl: 3     Multiple vitamin TABS, Take by mouth daily, Disp: , Rfl:      order for DME, Equipment being ordered: 1. AMT MiniAce Button. 12 french x 7 cm to have spare tube at home.  2. 1000ml gravity feeding bag for enema administration 2 per month 3. Right angle extension set for Mini ACE. 2 per month, Disp: 1 Month, Rfl: 11     ORDER FOR DME, AMT mini-one gastrostomy tube buttons, Disp: 1 Device, Rfl: 2     polyethylene glycol (GOLYTELY) 236 G suspension, Administer 500-700 ml via appendicostomy daily, Disp: 17967 mL, Rfl: 11     psyllium (METAMUCIL) 28.3 % packet, Take 1 packet by mouth daily, Disp: , Rfl:      Sennosides (EX-LAX PO), Take by mouth daily as needed, Disp: , Rfl:      traZODone (DESYREL) 50 MG tablet, TAKE ONE TABLET BY MOUTH DAILY AT BEDTIME, Disp: 30 tablet, Rfl: 0     fluticasone (FLONASE) 50 MCG/ACT nasal spray, Spray 2 sprays into both nostrils daily (Patient not taking: Reported on 2/24/2021), Disp: 16 g, Rfl: 3    Allergies  Allergies   Allergen Reactions     No Known Allergies        Social History   Social History     Socioeconomic History     Marital status: Single     Spouse name: Not on file     Number of children: Not on file     Years of education: Not on file     Highest education level: Not on file   Occupational History     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[employer], [employer],    Social Needs     Financial resource strain: Not on file     Food insecurity     Worry: Not on file     Inability: Not on file     Transportation needs      "Medical: Not on file     Non-medical: Not on file   Tobacco Use     Smoking status: Never Smoker     Smokeless tobacco: Never Used   Substance and Sexual Activity     Alcohol use: No     Drug use: No     Sexual activity: Never     Comment: 9/8/2011  ek 03/11/13    Lifestyle     Physical activity     Days per week: Not on file     Minutes per session: Not on file     Stress: Not on file   Relationships     Social connections     Talks on phone: Not on file     Gets together: Not on file     Attends Buddhism service: Not on file     Active member of club or organization: Not on file     Attends meetings of clubs or organizations: Not on file     Relationship status: Not on file     Intimate partner violence     Fear of current or ex partner: Not on file     Emotionally abused: Not on file     Physically abused: Not on file     Forced sexual activity: Not on file   Other Topics Concern     Parent/sibling w/ CABG, MI or angioplasty before 65F 55M? Not Asked   Social History Narrative    May 23, 2019    ENVIRONMENTAL HISTORY: [place] heated with a forced air. They do have central air conditioning. The patient's bedroom is furnished with carpeting in bedroom.  Pets inside the house include 1 cat and 1 dog. There is no history of cockroach or mice infestation. There are no smokers in the house.  The house does not have a damp basement.        Family History  Family History   Problem Relation Age of Onset     Allergies Mother         sulfa     Asthma Mother      Food Allergy Father         Milk     Asthma Brother      Allergies Brother        Review of Systems  As per HPI and PMHx, otherwise 10+ comprehensive system review is negative.    Physical Exam  /65 (BP Location: Right arm, Patient Position: Sitting, Cuff Size: Adult Large)   Pulse 78   Temp 98.1  F (36.7  C) (Tympanic)   Ht 1.765 m (5' 9.5\")   Wt 117 kg (258 lb)   BMI 37.55 kg/m    GENERAL: " Patient is a pleasant, cooperative 25 year old male in no acute distress.  HEAD: Normocephalic, atraumatic.  Hair and scalp are normal.  EYES: Pupils are equal, round, reactive to light and accommodation.  Extraocular movements are intact.  The sclera nonicteric without injection.  The extraocular structures are normal.  EARS: Normal shape and symmetry.  No tenderness when palpating the mastoid or tragal areas bilaterally.  Otoscopic exam reveals a minimal amount of cerumen bilaterally.  The bilateral tympanic membranes are round, intact without evidence of effusion, good landmarks.  No retraction, granulation, or drainage.  NOSE: The patient has medium thickness skin.  His bony nasal dorsum is reasonably straight.  His middle third is very pinched.  He does have a dorsal hump.  He has sagittal malpositioning of the lower lateral crura with narrowing of the internal and external nasal valve.  He has obvious nasal valve collapse more so on the right-hand side.  He is under-projected at the tip.  Anterior rhinoscopy reveals a rightward nasal septal deviation and severe/marked inferior turbinate hypertrophy with boggy, inflamed mucosa and sticky, inflammatory mucus.  No nasal cavity masses, polyps, or mucopurulence on anterior rhinoscopy.  Modified caudal maneuver reveals improvement more so on the right-hand side.  NEUROLOGIC: Cranial nerves II through XII are grossly intact.  Voice is strong.  Patient is House-Brackmann I/VI bilaterally.  CARDIOVASCULAR: Extremities are warm and well-perfused.  No significant peripheral edema.  RESPIRATORY: Patient has nonlabored breathing without cough, wheeze, stridor.  PSYCHIATRIC: Patient is alert and oriented.  Mood and affect appear normal.  SKIN: Warm and dry.  No scalp, face, or neck lesions noted.    Assessment and Plan     ICD-10-CM    1. Nasal obstruction  J34.89 budesonide (PULMICORT) 0.5 MG/2ML neb solution     cetirizine (ZYRTEC) 10 MG tablet     montelukast (SINGULAIR)  10 MG tablet   2. Nasal congestion  R09.81 budesonide (PULMICORT) 0.5 MG/2ML neb solution     cetirizine (ZYRTEC) 10 MG tablet     montelukast (SINGULAIR) 10 MG tablet   3. Chronic allergic rhinitis  J30.9 budesonide (PULMICORT) 0.5 MG/2ML neb solution     cetirizine (ZYRTEC) 10 MG tablet     montelukast (SINGULAIR) 10 MG tablet   4. Deviated nasal septum  J34.2 budesonide (PULMICORT) 0.5 MG/2ML neb solution     cetirizine (ZYRTEC) 10 MG tablet     montelukast (SINGULAIR) 10 MG tablet   5. Hypertrophy of both inferior nasal turbinates  J34.3 budesonide (PULMICORT) 0.5 MG/2ML neb solution     cetirizine (ZYRTEC) 10 MG tablet     montelukast (SINGULAIR) 10 MG tablet     It was my pleasure seeing Abram Sierras today in clinic.  The patient presents with nasal obstruction.  I think his nasal obstruction is multifactorial due to his previous positive allergy testing, potential dust mite or pet allergen exposure, some structural nasal issues including narrowing of the nasal valve, nasal valve collapse, rightward nasal septal deviation, turbinate hypertrophy.     He tried a topical nasal steroid in the past without significant benefit.  We discussed the possible Kenalog injection with budesonide irrigations, daily antihistamine, and leukotriene inhibitor.  The patient would like to defer Kenalog injection but is interested in the saline irrigations with budesonide, daily antihistamine, daily leukotriene inhibitor.  We reviewed nasal saline irrigation technique with budesonide.  I provided the patient with written instructions and prescriptions.     I like to see the patient back in 6 weeks to recheck his symptoms.  If he is not improving, we would consider repeat allergy evaluation, sinus imaging, consideration of surgical options. Given his nasal deformity, he likely would need to see a facial plastic surgeon for correction.    Shine Sims MD  Department of Otolarygology-Head and Neck Surgery  [facility]

## 2021-02-24 ENCOUNTER — OFFICE VISIT (OUTPATIENT)
Dept: OTOLARYNGOLOGY | Facility: CLINIC | Age: 26
End: 2021-02-24
Attending: FAMILY MEDICINE
Payer: COMMERCIAL

## 2021-02-24 VITALS
DIASTOLIC BLOOD PRESSURE: 65 MMHG | HEART RATE: 78 BPM | BODY MASS INDEX: 36.94 KG/M2 | HEIGHT: 70 IN | WEIGHT: 258 LBS | SYSTOLIC BLOOD PRESSURE: 120 MMHG | TEMPERATURE: 98.1 F

## 2021-02-24 DIAGNOSIS — R09.81 NASAL CONGESTION: ICD-10-CM

## 2021-02-24 DIAGNOSIS — J34.89 NASAL OBSTRUCTION: Primary | ICD-10-CM

## 2021-02-24 DIAGNOSIS — J34.3 HYPERTROPHY OF BOTH INFERIOR NASAL TURBINATES: ICD-10-CM

## 2021-02-24 DIAGNOSIS — J30.9 CHRONIC ALLERGIC RHINITIS: ICD-10-CM

## 2021-02-24 DIAGNOSIS — J34.2 DEVIATED NASAL SEPTUM: ICD-10-CM

## 2021-02-24 PROCEDURE — 99204 OFFICE O/P NEW MOD 45 MIN: CPT | Performed by: OTOLARYNGOLOGY

## 2021-02-24 RX ORDER — MONTELUKAST SODIUM 10 MG/1
10 TABLET ORAL AT BEDTIME
Qty: 90 TABLET | Refills: 3 | Status: SHIPPED | OUTPATIENT
Start: 2021-02-24 | End: 2021-02-25

## 2021-02-24 RX ORDER — CETIRIZINE HYDROCHLORIDE 10 MG/1
10 TABLET ORAL DAILY
Qty: 90 TABLET | Refills: 3 | Status: SHIPPED | OUTPATIENT
Start: 2021-02-24 | End: 2021-02-25

## 2021-02-24 RX ORDER — BUDESONIDE 0.5 MG/2ML
INHALANT ORAL
Qty: 360 ML | Refills: 3 | Status: SHIPPED | OUTPATIENT
Start: 2021-02-24 | End: 2021-02-25

## 2021-02-24 ASSESSMENT — MIFFLIN-ST. JEOR: SCORE: 2153.59

## 2021-02-24 NOTE — LETTER
2/24/2021         RE: Abram Fay  [address] [facility] [address]        Dear Colleague,    Thank you for referring your patient, Abram Fay, to the [facility]. Please see a copy of my visit note below.    Chief Complaint   Patient presents with     Ent Problem     issues with congestion for several years/mouth breath and possible snoring- possible deviated septum/PND     History of Present Illness   Abram Fay is a 25 year old male who presents today for evaluation.  I am seeing this patient in consultation for chronic rhinitis at the request of the provider Dr. Garza.  The patient describes symptoms of bilateral nasal obstruction that alternates sides.  He has had this problem for multiple years.  He does report some fairly persistent nasal congestion.  He denies any significant persistent rhinorrhea.  He does report some intermittent postnasal drainage.  He denies any taste or smell disturbance, face pain/pressure/fullness, or previous nose or sinus surgery.  No issues with epistaxis.  He tried Flonase in the past without significant benefit.  He is also tried oral antihistamines in the past with intermittent benefit.  The patient's past history is significant for seasonal allergy symptoms.  He is previously undergone allergy testing which showed positives to cat, dog, dust mite, and several pollens.  He does cohabitate with a dog and cat but does not feel like he has much symptoms from the pet exposure.  He is not had recent nose or sinus imaging.    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  Irrigate each nostril with one half of the bottle twice daily., Disp: 360 mL, Rfl: 3     cetirizine (ZYRTEC) 10 MG tablet, Take 1 tablet (10 mg) by mouth daily, Disp: 90 tablet, Rfl: 3     diphenhydrAMINE (BENADRYL) 25 MG capsule, Take 25 mg by mouth every 6 hours as needed for itching or allergies, Disp: , Rfl:      Docusate Calcium (STOOL SOFTENER PO), Take by mouth daily, Disp: , Rfl:      Loratadine (CLARITIN PO), Take by mouth daily as needed , Disp: , Rfl:      MELATONIN PO, Take by mouth nightly as needed, Disp: , Rfl:      montelukast (SINGULAIR) 10 MG tablet, Take 1 tablet (10 mg) by mouth At Bedtime, Disp: 90 tablet, Rfl: 3     Multiple vitamin TABS, Take by mouth daily, Disp: , Rfl:      order for DME, Equipment being ordered: 1. AMT MiniAce Button. 12 french x 7 cm to have spare tube at home.  2. 1000ml gravity feeding bag for enema administration 2 per month 3. Right angle extension set for Mini ACE. 2 per month, Disp: 1 Month, Rfl: 11     ORDER FOR DME, AMT mini-one gastrostomy tube buttons, Disp: 1 Device, Rfl: 2     polyethylene glycol (GOLYTELY) 236 G suspension, Administer 500-700 ml via appendicostomy daily, Disp: 64233 mL, Rfl: 11     psyllium (METAMUCIL) 28.3 % packet, Take 1 packet by mouth daily, Disp: , Rfl:      Sennosides (EX-LAX PO), Take by mouth daily as needed, Disp: , Rfl:      traZODone (DESYREL) 50 MG tablet, TAKE ONE TABLET BY MOUTH DAILY AT BEDTIME, Disp: 30 tablet, Rfl: 0     fluticasone (FLONASE) 50 MCG/ACT nasal spray, Spray 2 sprays into both nostrils daily (Patient not taking: Reported on 2/24/2021), Disp: 16 g, Rfl: 3    Allergies  Allergies   Allergen Reactions     No Known Allergies        Social History   Social History     Socioeconomic History     Marital status: Single     Spouse name: Not on file     Number of children: Not on file     Years of education: Not on file     Highest education  level: Not on file   Occupational History     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[employer], [employer],    Social Needs     Financial resource strain: Not on file     Food insecurity     Worry: Not on file     Inability: Not on file     Transportation needs     Medical: Not on file     Non-medical: Not on file   Tobacco Use     Smoking status: Never Smoker     Smokeless tobacco: Never Used   Substance and Sexual Activity     Alcohol use: No     Drug use: No     Sexual activity: Never     Comment: 9/8/2011  ek 03/11/13    Lifestyle     Physical activity     Days per week: Not on file     Minutes per session: Not on file     Stress: Not on file   Relationships     Social connections     Talks on phone: Not on file     Gets together: Not on file     Attends Congregational service: Not on file     Active member of club or organization: Not on file     Attends meetings of clubs or organizations: Not on file     Relationship status: Not on file     Intimate partner violence     Fear of current or ex partner: Not on file     Emotionally abused: Not on file     Physically abused: Not on file     Forced sexual activity: Not on file   Other Topics Concern     Parent/sibling w/ CABG, MI or angioplasty before 65F 55M? Not Asked   Social History Narrative    May 23, 2019    ENVIRONMENTAL HISTORY: [place] The home is heated with a forced air. They do have central air conditioning. The patient's bedroom is furnished with carpeting in bedroom.  Pets inside the house include 1 cat and 1 dog. There is no history of cockroach or mice infestation. There are no smokers in the house.  The house does not have a damp basement.        Family History  Family History   Problem Relation Age of Onset     Allergies Mother         sulfa     Asthma Mother      Food Allergy Father         Milk     Asthma Brother      Allergies Brother        Review of Systems  As per HPI and PMHx, otherwise 10+ comprehensive system  "review is negative.    Physical Exam  /65 (BP Location: Right arm, Patient Position: Sitting, Cuff Size: Adult Large)   Pulse 78   Temp 98.1  F (36.7  C) (Tympanic)   Ht 1.765 m (5' 9.5\")   Wt 117 kg (258 lb)   BMI 37.55 kg/m    GENERAL: Patient is a pleasant, cooperative 25 year old male in no acute distress.  HEAD: Normocephalic, atraumatic.  Hair and scalp are normal.  EYES: Pupils are equal, round, reactive to light and accommodation.  Extraocular movements are intact.  The sclera nonicteric without injection.  The extraocular structures are normal.  EARS: Normal shape and symmetry.  No tenderness when palpating the mastoid or tragal areas bilaterally.  Otoscopic exam reveals a minimal amount of cerumen bilaterally.  The bilateral tympanic membranes are round, intact without evidence of effusion, good landmarks.  No retraction, granulation, or drainage.  NOSE: The patient has medium thickness skin.  His bony nasal dorsum is reasonably straight.  His middle third is very pinched.  He does have a dorsal hump.  He has sagittal malpositioning of the lower lateral crura with narrowing of the internal and external nasal valve.  He has obvious nasal valve collapse more so on the right-hand side.  He is under-projected at the tip.  Anterior rhinoscopy reveals a rightward nasal septal deviation and severe/marked inferior turbinate hypertrophy with boggy, inflamed mucosa and sticky, inflammatory mucus.  No nasal cavity masses, polyps, or mucopurulence on anterior rhinoscopy.  Modified caudal maneuver reveals improvement more so on the right-hand side.  NEUROLOGIC: Cranial nerves II through XII are grossly intact.  Voice is strong.  Patient is House-Brackmann I/VI bilaterally.  CARDIOVASCULAR: Extremities are warm and well-perfused.  No significant peripheral edema.  RESPIRATORY: Patient has nonlabored breathing without cough, wheeze, stridor.  PSYCHIATRIC: Patient is alert and oriented.  Mood and affect appear " normal.  SKIN: Warm and dry.  No scalp, face, or neck lesions noted.    Assessment and Plan     ICD-10-CM    1. Nasal obstruction  J34.89 budesonide (PULMICORT) 0.5 MG/2ML neb solution     cetirizine (ZYRTEC) 10 MG tablet     montelukast (SINGULAIR) 10 MG tablet   2. Nasal congestion  R09.81 budesonide (PULMICORT) 0.5 MG/2ML neb solution     cetirizine (ZYRTEC) 10 MG tablet     montelukast (SINGULAIR) 10 MG tablet   3. Chronic allergic rhinitis  J30.9 budesonide (PULMICORT) 0.5 MG/2ML neb solution     cetirizine (ZYRTEC) 10 MG tablet     montelukast (SINGULAIR) 10 MG tablet   4. Deviated nasal septum  J34.2 budesonide (PULMICORT) 0.5 MG/2ML neb solution     cetirizine (ZYRTEC) 10 MG tablet     montelukast (SINGULAIR) 10 MG tablet   5. Hypertrophy of both inferior nasal turbinates  J34.3 budesonide (PULMICORT) 0.5 MG/2ML neb solution     cetirizine (ZYRTEC) 10 MG tablet     montelukast (SINGULAIR) 10 MG tablet     It was my pleasure seeing Abram Fay today in clinic.  The patient presents with nasal obstruction.  I think his nasal obstruction is multifactorial due to his previous positive allergy testing, potential dust mite or pet allergen exposure, some structural nasal issues including narrowing of the nasal valve, nasal valve collapse, rightward nasal septal deviation, turbinate hypertrophy.     He tried a topical nasal steroid in the past without significant benefit.  We discussed the possible Kenalog injection with budesonide irrigations, daily antihistamine, and leukotriene inhibitor.  The patient would like to defer Kenalog injection but is interested in the saline irrigations with budesonide, daily antihistamine, daily leukotriene inhibitor.  We reviewed nasal saline irrigation technique with budesonide.  I provided the patient with written instructions and prescriptions.     I like to see the patient back in 6 weeks to recheck his symptoms.  If he is not improving, we would consider repeat allergy  evaluation, sinus imaging, consideration of surgical options. Given his nasal deformity, he likely would need to see a facial plastic surgeon for correction.    Shine Sims MD  Department of Otolarygology-Head and Neck Surgery  [facility]         Again, thank you for allowing me to participate in the care of your patient.        Sincerely,        Shine Sims MD

## 2021-02-24 NOTE — NURSING NOTE
"Initial /65 (BP Location: Right arm, Patient Position: Sitting, Cuff Size: Adult Large)   Pulse 78   Temp 98.1  F (36.7  C) (Tympanic)   Ht 1.765 m (5' 9.5\")   Wt 117 kg (258 lb)   BMI 37.55 kg/m   Estimated body mass index is 37.55 kg/m  as calculated from the following:    Height as of this encounter: 1.765 m (5' 9.5\").    Weight as of this encounter: 117 kg (258 lb). .    Cheri Powell CMA    "

## 2021-02-24 NOTE — PATIENT INSTRUCTIONS
Per physician instructions      If you have questions or concerns on any instructions given to you by your provider today or if you need to schedule an appointment, you can reach us at [phone].     1) Zyrtec daily.  2) Singular at bedtime.  3) Daily nasal saline irrigation with budesonide.  4) Maybe Allergy referral for consideration of allergy management.     BUDESONIDE IRRIGATION INSTRUCTIONS    You will be starting Budesonide nasal irrigations and will need to obtain the following:      - NeilMed Sinus Rinse 8 oz Kit  - Distilled or filtered water   - Normal saline salt packets  - Budesonide medication     Place filtered or distilled water into the NeilMed bottle up to the fill line (DO NOT USE TAP OR WELL WATER).  Place the pre-made salt packet in the 8 oz of saline.  Then placed the budesonide medication into the bottle.  Shake the bottle to suspend into solution.  Lean head forward over a sink or a basin.  Rinse each side of the nose with one-half of the bottle (each squeeze is about one-half of the bottle). Rinse the nose twice daily.     You will follow up with your ENT surgeon in 8-12 weeks to recheck your symptoms.  If you are having problems with your irrigations or problems obtaining the medication, please contact your ENT surgeon.    Video example: [email or web address]

## 2021-02-25 ENCOUNTER — NURSE TRIAGE (OUTPATIENT)
Dept: NURSING | Facility: CLINIC | Age: 26
End: 2021-02-25

## 2021-02-25 DIAGNOSIS — F51.01 PRIMARY INSOMNIA: ICD-10-CM

## 2021-02-25 DIAGNOSIS — J30.0 CHRONIC VASOMOTOR RHINITIS: ICD-10-CM

## 2021-02-25 DIAGNOSIS — J34.2 DEVIATED NASAL SEPTUM: ICD-10-CM

## 2021-02-25 DIAGNOSIS — J34.89 NASAL OBSTRUCTION: ICD-10-CM

## 2021-02-25 DIAGNOSIS — K94.19 ALTERED BOWEL ELIMINATION DUE TO INTESTINAL OSTOMY (H): Primary | ICD-10-CM

## 2021-02-25 DIAGNOSIS — J34.3 HYPERTROPHY OF BOTH INFERIOR NASAL TURBINATES: ICD-10-CM

## 2021-02-25 DIAGNOSIS — R09.81 NASAL CONGESTION: ICD-10-CM

## 2021-02-25 DIAGNOSIS — J30.9 CHRONIC ALLERGIC RHINITIS: ICD-10-CM

## 2021-02-25 NOTE — TELEPHONE ENCOUNTER
Needs his NEW RX medications sent to new pharmacy , [facility] [phone] group [place] coordinator    Pharmacy to be sent to [facility] in [facility] [phone]  Fax [phone]    Moving in to [facility] March 1.  Erica Munoz RN  [facility] Nurse Advisor

## 2021-02-26 RX ORDER — MONTELUKAST SODIUM 10 MG/1
10 TABLET ORAL AT BEDTIME
Qty: 90 TABLET | Refills: 3 | Status: SHIPPED | OUTPATIENT
Start: 2021-02-26 | End: 2022-12-08

## 2021-02-26 RX ORDER — CETIRIZINE HYDROCHLORIDE 10 MG/1
10 TABLET ORAL DAILY
Qty: 90 TABLET | Refills: 3 | Status: SHIPPED | OUTPATIENT
Start: 2021-02-26 | End: 2022-12-08

## 2021-02-26 RX ORDER — TRAZODONE HYDROCHLORIDE 50 MG/1
TABLET, FILM COATED ORAL
Qty: 90 TABLET | Refills: 3 | Status: SHIPPED | OUTPATIENT
Start: 2021-02-26 | End: 2021-03-15

## 2021-02-26 RX ORDER — BUDESONIDE 0.5 MG/2ML
INHALANT ORAL
Qty: 360 ML | Refills: 3 | Status: SHIPPED | OUTPATIENT
Start: 2021-02-26 | End: 2022-12-08

## 2021-02-26 RX ORDER — FLUTICASONE PROPIONATE 50 MCG
2 SPRAY, SUSPENSION (ML) NASAL DAILY
Qty: 16 G | Refills: 3 | OUTPATIENT
Start: 2021-02-26

## 2021-02-26 NOTE — TELEPHONE ENCOUNTER
I refilled the medications I am prescribing.  The remaining meds are new ones started by Dr Sims.  I would prefer he fill these so he can monitor response to treatment.  Please forward request to his team.    Magda Garza,

## 2021-03-13 DIAGNOSIS — F51.01 PRIMARY INSOMNIA: ICD-10-CM

## 2021-03-15 RX ORDER — TRAZODONE HYDROCHLORIDE 50 MG/1
TABLET, FILM COATED ORAL
Qty: 90 TABLET | Refills: 1 | Status: SHIPPED | OUTPATIENT
Start: 2021-03-15 | End: 2022-03-03

## 2021-03-15 NOTE — TELEPHONE ENCOUNTER
Routing refill request to provider for review/approval because:  Drug not on the [facility] refill protocol   Zander Nixon RN

## 2021-05-25 DIAGNOSIS — K94.19 ALTERED BOWEL ELIMINATION DUE TO INTESTINAL OSTOMY (H): Primary | ICD-10-CM

## 2021-06-25 DIAGNOSIS — K94.19 ALTERED BOWEL ELIMINATION DUE TO INTESTINAL OSTOMY (H): Primary | ICD-10-CM

## 2021-09-19 ENCOUNTER — HEALTH MAINTENANCE LETTER (OUTPATIENT)
Age: 26
End: 2021-09-19

## 2021-10-18 NOTE — TELEPHONE ENCOUNTER
H I S T O R Y   Patient Identification  Alfonso Martinez is a 19 year old female.    Patient information was obtained from patient.  History/Exam limitations: none.  Patient presented to the Emergency Department by private vehicle.    Chief Complaint   Patient presents with   • Medical Screening Exam       HPI    Alfonso Martinez is a 19 year old female with a PMHx of Chlamydia presenting to the emergency department today for evaluation of STD exposure. She states that she was told by her last sexual partner that they have an STD. She states that she had unprotected sex with this person. States that she has had multiple sexual partners but that she uses a condom. She has not noticed anything unusual, denies abnormal vaginal discharge, pain with urination, or pain during intercourse. She has been diagnosed with chlamydia in the past.  Recently stopped birth control and relates that her menstrual cycle is irregular, unable to recall her LMP. She is requesting a pregnancy screening as well.      The patient denies fever, chills, rash, shortness of breath, cough, chest pain, abdominal pain, nausea, vomiting, diarrhea, constipation, dysuria, back pain, headache, dizziness, or any other complaints.    Past Medical History:   Diagnosis Date   • RAD (reactive airway disease)        ALLERGIES:  No Known Allergies    Social History     Substance and Sexual Activity   Alcohol Use None     Social History     Tobacco Use   Smoking Status Not on file     History   Drug Use Not on file       R E V I E W  O F  S Y M P T O M S     Review of Systems   Constitutional: Negative for activity change, chills and fever.   HENT: Negative for sore throat.    Eyes: Negative.    Respiratory: Negative for cough and shortness of breath.    Cardiovascular: Negative for chest pain, palpitations and leg swelling.   Gastrointestinal: Negative for abdominal pain, nausea and vomiting.   Endocrine: Negative.    Genitourinary: Negative for decreased  I received [facility] call from [facility] stating that mom was requesting replacement supplies for appendicostomy care and ACE administration. Since I have not seen Abram for over one year, I left mom a message requesting that she call me for an update and possible clinic visit.      · Wound care, podiatry and dermatology evaluated  · Wound cultures obtained by dermatology growing multiple organisms- felt to be skin colonization  · ID consulted and recommended stopping ABX  · ID consultation appreciated, no further antibiotics recommended  · BLE elevation, edema control is imperative  · Stable for discharge per podiatry-will follow up with the [address] on discharge urine volume, difficulty urinating, dysuria, flank pain, frequency, genital sores, menstrual problem, pelvic pain, urgency, vaginal bleeding, vaginal discharge and vaginal pain.   Musculoskeletal: Negative for back pain and myalgias.   Skin: Negative for rash.   Allergic/Immunologic: Negative.    Neurological: Negative for dizziness, light-headedness and headaches.   Hematological: Negative.    Psychiatric/Behavioral: Negative.          P H Y S I C A L  E X A M     Vitals with min/max:      Vital Last Value 24 Hour Range   Temperature 98.4 °F (36.9 °C) (10/18/21 0037) Temp  Min: 98.4 °F (36.9 °C)  Max: 98.4 °F (36.9 °C)   Pulse 97 (10/18/21 0037) Pulse  Min: 97  Max: 97   Respiratory 16 (10/18/21 0037) Resp  Min: 16  Max: 16   Non-Invasive  Blood Pressure 109/74 (10/18/21 0115) BP  Min: 109/74  Max: 122/93   Pulse Oximetry 100 % (10/18/21 0037) SpO2  Min: 100 %  Max: 100 %   Arterial   Blood Pressure   No data recorded       Physical Exam  Vitals and nursing note reviewed.   Constitutional:       Appearance: She is well-developed.   HENT:      Head: Normocephalic and atraumatic.      Mouth/Throat:      Mouth: Mucous membranes are moist.   Eyes:      Extraocular Movements: Extraocular movements intact.      Pupils: Pupils are equal, round, and reactive to light.   Cardiovascular:      Rate and Rhythm: Normal rate and regular rhythm.      Pulses: Normal pulses.      Heart sounds: Normal heart sounds.   Pulmonary:      Effort: Pulmonary effort is normal.      Breath sounds: Normal breath sounds.   Abdominal:      General: Abdomen is flat. Bowel sounds are normal.      Palpations: Abdomen is soft.      Tenderness: There is no abdominal tenderness.   Genitourinary:     Comments: Deferred, opted for self swab  Musculoskeletal:         General: Normal range of motion.      Cervical back: Normal range of motion and neck supple.   Skin:     General: Skin is warm and dry.      Capillary Refill: Capillary refill takes less  than 2 seconds.      Findings: No rash.   Neurological:      General: No focal deficit present.      Mental Status: She is alert.   Psychiatric:         Thought Content: Thought content normal.           M E D I C A L  D E C I S I O N  M A K I N ANNALISA Martinez is a 19 year old female presenting to the ED with hx and physical as above.     Multiple differential diagnoses were considered for this patient.  Discussed with patient plan of care including diagnostics and therapeutics.  Patient agrees to course of care.  Will continue to monitor and update.     VIPIN I CARTER G N O S T I C  R E S U L T S          Results for orders placed or performed during the hospital encounter of 10/18/21   WET MOUNT   Result Value Ref Range    CLUE CELLS, WET MOUNT Present (A) None Seen    TRICHOMONAS, WET MOUNT None Seen None Seen    YEAST, WET MOUNT None Seen None Seen   URINALYSIS DIPSTICK ONLY   Result Value Ref Range    COLOR, URINALYSIS Yellow     APPEARANCE, URINALYSIS Clear     GLUCOSE, URINALYSIS Negative Negative mg/dL    BILIRUBIN, URINALYSIS Negative Negative    KETONES, URINALYSIS 15   (A) Negative mg/dL    SPECIFIC GRAVITY, URINALYSIS >=1.030 1.005 - 1.030    OCCULT BLOOD, URINALYSIS Negative Negative    PH, URINALYSIS 6.0 5.0 - 7.0    PROTEIN, URINALYSIS 30  (A) Negative mg/dL    UROBILINOGEN, URINALYSIS 0.2 0.2, 1.0 mg/dL    NITRITE, URINALYSIS Negative Negative    LEUKOCYTE ESTERASE, URINALYSIS Negative Negative   Urine pregnancy POC   Result Value Ref Range    URINE PREGNANCY,QUAL Negative Negative    Internal Procedural Controls Acceptable Yes    URINALYSIS, MACROSCOPIC -POINT OF CARE   Result Value Ref Range    COLOR - POINT OF CARE Yellow     APPEARANCE, URINALYSIS - POINT OF CARE Clear     GLUCOSE, URINALYSIS - POINT OF CARE Negative Negative mg/dL    BILIRUBIN, URINALYSIS - POINT OF CARE Small (A) Negative    KETONES, URINALYSIS - POINT OF CARE 15   (A) Negative mg/dL    SPECIFIC GRAVITY, URINALYSIS - POINT OF  CARE >1.030 (H) 1.005 - 1.030 NULL    OCCULT BLOOD, URINALYSIS - POINT OF CARE Negative Negative    PH, URINALYSIS - POINT OF CARE 6.0 5.0 - 7.0 Units    PROTEIN, URINALYSIS - POINT OF CARE 100  (A) Negative mg/dL    UROBILINOGEN, URINALYSIS - POINT OF CARE 0.2 0.2, 1.0 mg/dL    NITRITE, URINALYSIS - POINT OF CARE Negative Negative    WBC ESTERASE, URINALYSIS - POINT OF CARE Negative Negative       No orders to display       E D  C O U R S E     ED Course as of Oct 18 0236   Mon Oct 18, 2021   0145 Patient was apprised of diagnostic results.  Presenting complaint improving. Discharge instructions discussed with patient including follow-up with primary care doctor as needed.  Strict return instructions discussed, patient understands these instructions.       [GD]      ED Course User Index  [GD] Franklin Bentley MD       Medications   cefTRIAXone (ROCEPHIN) injection 500 mg (500 mg Intramuscular Given 10/18/21 0205)   doxycycline hyclate (VIBRAMYCIN) capsule 100 mg (100 mg Oral Given 10/18/21 0204)          C L I N I C A L  I M P R E S S I O N     ED Diagnosis   1. Exposure to STD     2. Bacterial vaginosis         Disposition        Discharge 10/18/2021  1:47 AM  Alfonso Martinez discharge to home/self care.           Summary of your Discharge Medications      Take these Medications      Details   doxycycline hyclate 100 MG tablet  Commonly known as: VIBRA-TABS   Take 1 tablet by mouth every 12 hours for 13 doses.     metroNIDAZOLE 500 MG tablet  Commonly known as: Flagyl   Take 1 tablet by mouth every 12 hours for 7 days. Do not drink alcohol while taking this medication           Charting performed by ED Sylvain Hurley for Dr. Bentley    I have reviewed the sylvain's charting and agree that the final signed note accurately reflects my personal performance of the history, physical exam, hospital course, and assessment and plan.       Franklin Bentley MD  10/18/21 0236

## 2021-12-13 ENCOUNTER — TELEPHONE (OUTPATIENT)
Dept: SURGERY | Facility: CLINIC | Age: 26
End: 2021-12-13
Payer: COMMERCIAL

## 2021-12-22 ENCOUNTER — OFFICE VISIT (OUTPATIENT)
Dept: SURGERY | Facility: CLINIC | Age: 26
End: 2021-12-22
Attending: NURSE PRACTITIONER
Payer: COMMERCIAL

## 2021-12-22 VITALS — BODY MASS INDEX: 42.81 KG/M2 | WEIGHT: 294.09 LBS

## 2021-12-22 DIAGNOSIS — K59.00 CONSTIPATION, UNSPECIFIED CONSTIPATION TYPE: Primary | ICD-10-CM

## 2021-12-22 DIAGNOSIS — K94.19 ALTERED BOWEL ELIMINATION DUE TO INTESTINAL OSTOMY (H): ICD-10-CM

## 2021-12-22 DIAGNOSIS — E66.01 MORBID OBESITY (H): ICD-10-CM

## 2021-12-22 PROCEDURE — 49450 REPLACE G/C TUBE PERC: CPT

## 2021-12-22 PROCEDURE — 49450 REPLACE G/C TUBE PERC: CPT | Mod: 52 | Performed by: NURSE PRACTITIONER

## 2021-12-22 PROCEDURE — G0463 HOSPITAL OUTPT CLINIC VISIT: HCPCS

## 2021-12-22 RX ORDER — SENNOSIDES A AND B 8.6 MG/1
1 TABLET, FILM COATED ORAL DAILY
Qty: 30 TABLET | Refills: 11 | Status: SHIPPED | OUTPATIENT
Start: 2021-12-22 | End: 2022-01-21

## 2021-12-22 NOTE — LETTER
12/22/2021      RE: Abram Fay  [id number]  [place] [address]       Data: Abram Fay is seen today at the [facility] for a routine appendicostomy tube change. The patient is accompanied by his mom who is his guardian. On review, the patient/family has no questions or concerns regarding the use and care of the g-tube. He is currently doing daily antegrade colonic enema of 500 ml Golytely via his appendicostomy. This produces a bowel movement. He reports that his is intermittently taking oral Ex-lax but he has trouble finding a consistent supply. I will send an rx for ExLax because I would like him to take this daily in the hope that he will begin having spontaneous bowel movements. Ramses reports he needs new supplies but has trouble obtaining them from current supplier.   On exam, the appendicostomy site is clean and dry. Hi abdomen is obese. We discussed increasing activity level to begin losing the weight that he has recently gained.   The current appendicostomy tube was removed and a 10 Urdu x 7 cm AMT mini ACE tube was placed. 3 mL of saline was instilled in the balloon. Gastric contents returned from lumen of new tube to verify placement in the stomach. The patient/family did not wish to learn how to change the tube. They did verbalize understanding of information given.    Assessment: Uncomplicated appendicostomy tube change.    Plan: Follow up in 3-6 months for next tube change. Will send orders for supplies to [facility] TAMY Servin CNP

## 2021-12-23 NOTE — PROGRESS NOTES
Data: Abram Fay is seen today at the [facility] for a routine appendicostomy tube change. The patient is accompanied by his mom who is his guardian. On review, the patient/family has no questions or concerns regarding the use and care of the g-tube. He is currently doing daily antegrade colonic enema of 500 ml Golytely via his appendicostomy. This produces a bowel movement. He reports that his is intermittently taking oral Ex-lax but he has trouble finding a consistent supply. I will send an rx for ExLax because I would like him to take this daily in the hope that he will begin having spontaneous bowel movements. Ramses reports he needs new supplies but has trouble obtaining them from current supplier.   On exam, the appendicostomy site is clean and dry. Hi abdomen is obese. We discussed increasing activity level to begin losing the weight that he has recently gained.   The current appendicostomy tube was removed and a 10 Croatian x 7 cm AMT mini ACE tube was placed. 3 mL of saline was instilled in the balloon. Gastric contents returned from lumen of new tube to verify placement in the stomach. The patient/family did not wish to learn how to change the tube. They did verbalize understanding of information given.    Assessment: Uncomplicated appendicostomy tube change.    Plan: Follow up in 3-6 months for next tube change. Will send orders for supplies to [facility] [employer]

## 2022-03-05 ENCOUNTER — HEALTH MAINTENANCE LETTER (OUTPATIENT)
Age: 27
End: 2022-03-05

## 2022-03-25 DIAGNOSIS — K94.19 ALTERED BOWEL ELIMINATION DUE TO INTESTINAL OSTOMY (H): ICD-10-CM

## 2022-03-25 RX ORDER — PSYLLIUM HUSK (WITH SUGAR) 3 G/12 G
POWDER (GRAM) ORAL
Qty: 369 G | Refills: 0 | Status: SHIPPED | OUTPATIENT
Start: 2022-03-25 | End: 2022-12-08

## 2022-03-25 NOTE — TELEPHONE ENCOUNTER
Routing refill request to provider for review/approval because:  Drug not on the [facility] refill protocol     Won Barreto RN

## 2022-06-25 ENCOUNTER — HEALTH MAINTENANCE LETTER (OUTPATIENT)
Age: 27
End: 2022-06-25

## 2022-11-03 ENCOUNTER — MYC REFILL (OUTPATIENT)
Dept: SURGERY | Facility: CLINIC | Age: 27
End: 2022-11-03

## 2022-11-03 ENCOUNTER — MYC MEDICAL ADVICE (OUTPATIENT)
Dept: FAMILY MEDICINE | Facility: CLINIC | Age: 27
End: 2022-11-03

## 2022-11-03 DIAGNOSIS — K94.19 ALTERED BOWEL ELIMINATION DUE TO INTESTINAL OSTOMY (H): Primary | ICD-10-CM

## 2022-11-03 NOTE — TELEPHONE ENCOUNTER
Ramses's mother contacted me via [employer] for refill of Golytely for his antegrade enemas as they are switching pharmacies. Refill sent to [facility] pharmacy in [place].

## 2022-11-20 ENCOUNTER — HEALTH MAINTENANCE LETTER (OUTPATIENT)
Age: 27
End: 2022-11-20

## 2022-12-08 ENCOUNTER — VIRTUAL VISIT (OUTPATIENT)
Dept: FAMILY MEDICINE | Facility: CLINIC | Age: 27
End: 2022-12-08
Payer: COMMERCIAL

## 2022-12-08 DIAGNOSIS — J34.2 DEVIATED NASAL SEPTUM: ICD-10-CM

## 2022-12-08 DIAGNOSIS — J34.3 HYPERTROPHY OF BOTH INFERIOR NASAL TURBINATES: ICD-10-CM

## 2022-12-08 DIAGNOSIS — R09.81 NASAL CONGESTION: ICD-10-CM

## 2022-12-08 DIAGNOSIS — J30.9 CHRONIC ALLERGIC RHINITIS: ICD-10-CM

## 2022-12-08 DIAGNOSIS — J34.89 NASAL OBSTRUCTION: ICD-10-CM

## 2022-12-08 PROCEDURE — 99442 PR PHYSICIAN TELEPHONE EVALUATION 11-20 MIN: CPT | Mod: 95 | Performed by: FAMILY MEDICINE

## 2022-12-08 RX ORDER — CETIRIZINE HYDROCHLORIDE 10 MG/1
10 TABLET ORAL DAILY
Qty: 90 TABLET | Refills: 3 | Status: SHIPPED | OUTPATIENT
Start: 2022-12-08 | End: 2024-01-16

## 2022-12-08 RX ORDER — MONTELUKAST SODIUM 10 MG/1
10 TABLET ORAL AT BEDTIME
Qty: 90 TABLET | Refills: 3 | Status: SHIPPED | OUTPATIENT
Start: 2022-12-08 | End: 2024-01-16

## 2022-12-08 NOTE — PROGRESS NOTES
"Ramses is a 27 year old who is being evaluated via a billable video visit.      How would you like to obtain your AVS? MyChart  If the video visit is dropped, the invitation should be resent by: Text to cell phone: [phone]  Will anyone else be joining your video visit? No          A/P:      ICD-10-CM    1. Nasal obstruction  J34.89 cetirizine (ZYRTEC) 10 MG tablet     montelukast (SINGULAIR) 10 MG tablet      2. Nasal congestion  R09.81 cetirizine (ZYRTEC) 10 MG tablet     montelukast (SINGULAIR) 10 MG tablet      3. Chronic allergic rhinitis  J30.9 cetirizine (ZYRTEC) 10 MG tablet     montelukast (SINGULAIR) 10 MG tablet      4. Deviated nasal septum  J34.2 cetirizine (ZYRTEC) 10 MG tablet     montelukast (SINGULAIR) 10 MG tablet      5. Hypertrophy of both inferior nasal turbinates  J34.3 cetirizine (ZYRTEC) 10 MG tablet     montelukast (SINGULAIR) 10 MG tablet        Medications refilled, symptoms controlled    Subjective   Ramses is a 27 year old, presenting for the following health issues:  Forms      HPI     * forms for social security - this is no longer a need.  Thought he needed forms completed to have his housing covered since he moved out of his group home but they were able to get this taken care of the Social Security office.  The group home he moved into last year was a \"[facility]\".  Was able to move out and in a new place in [place].  Took over his Mom's lease.  Currently looking for subsidized housing with his .  All needs met here.    * refill for allergy medications    Really just needs refills of his allergy medications.  Feels these symptoms are well controlled.  No concerns.        Review of Systems   Constitutional, HEENT, cardiovascular, pulmonary, gi and gu systems are negative, except as otherwise noted.      Objective           Vitals:  No vitals were obtained today due to virtual visit.    Physical Exam   Unable to complete due to video not working    Epic " reviewed            Video-Visit Details    Video Start Time:5:43 PM    Type of service:  Video Visit    Video End Time:5:58 PM    Originating Location (pt. Location): Home        Distant Location (provider location):  On-site    Platform used for Video Visit: Unable to complete video visit  Pt unable to connect over video, platform not supported.  Completed visit by phone.

## 2023-04-12 ENCOUNTER — OFFICE VISIT (OUTPATIENT)
Dept: INTERNAL MEDICINE | Facility: CLINIC | Age: 28
End: 2023-04-12
Payer: COMMERCIAL

## 2023-04-12 VITALS
WEIGHT: 289.8 LBS | TEMPERATURE: 98.2 F | RESPIRATION RATE: 16 BRPM | OXYGEN SATURATION: 97 % | HEIGHT: 72 IN | BODY MASS INDEX: 39.25 KG/M2 | DIASTOLIC BLOOD PRESSURE: 72 MMHG | HEART RATE: 107 BPM | SYSTOLIC BLOOD PRESSURE: 122 MMHG

## 2023-04-12 DIAGNOSIS — F51.01 PRIMARY INSOMNIA: ICD-10-CM

## 2023-04-12 DIAGNOSIS — F84.0 ACTIVE AUTISTIC DISORDER: ICD-10-CM

## 2023-04-12 DIAGNOSIS — Z13.220 SCREENING FOR HYPERLIPIDEMIA: ICD-10-CM

## 2023-04-12 DIAGNOSIS — K94.19 ALTERED BOWEL ELIMINATION DUE TO INTESTINAL OSTOMY (H): ICD-10-CM

## 2023-04-12 DIAGNOSIS — Z13.0 SCREENING FOR IRON DEFICIENCY ANEMIA: ICD-10-CM

## 2023-04-12 DIAGNOSIS — Z13.1 SCREENING FOR DIABETES MELLITUS: ICD-10-CM

## 2023-04-12 DIAGNOSIS — Z00.00 ENCOUNTER FOR PREVENTIVE HEALTH EXAMINATION: Primary | ICD-10-CM

## 2023-04-12 PROCEDURE — 99395 PREV VISIT EST AGE 18-39: CPT

## 2023-04-12 RX ORDER — TRAZODONE HYDROCHLORIDE 50 MG/1
TABLET, FILM COATED ORAL
Qty: 30 TABLET | Refills: 11 | Status: SHIPPED | OUTPATIENT
Start: 2023-04-12 | End: 2024-04-12

## 2023-04-12 ASSESSMENT — ENCOUNTER SYMPTOMS
HEARTBURN: 0
HEADACHES: 0
PARESTHESIAS: 0
WEAKNESS: 0
SHORTNESS OF BREATH: 0
ABDOMINAL PAIN: 0
MYALGIAS: 0
DIARRHEA: 0
FEVER: 0
DIZZINESS: 0
HEMATURIA: 0
FREQUENCY: 0
CHILLS: 0
HEMATOCHEZIA: 0
NAUSEA: 0
ARTHRALGIAS: 0
NERVOUS/ANXIOUS: 0
JOINT SWELLING: 0
CONSTIPATION: 0
DYSURIA: 0
COUGH: 0
PALPITATIONS: 0
EYE PAIN: 0
SORE THROAT: 0

## 2023-04-12 ASSESSMENT — PAIN SCALES - GENERAL: PAINLEVEL: NO PAIN (0)

## 2023-04-12 ASSESSMENT — ACTIVITIES OF DAILY LIVING (ADL): CURRENT_FUNCTION: NO ASSISTANCE NEEDED

## 2023-04-12 NOTE — PROGRESS NOTES
"SUBJECTIVE:   CC: Ramses is an 28 year old who presents for preventative health visit.        View : No data to display.              Patient has been advised of split billing requirements and indicates understanding: Yes  Healthy Habits:     In general, how would you rate your overall health?  Excellent    Frequency of exercise:  2-3 days/week    Duration of exercise:  30-45 minutes    Do you usually eat at least 4 servings of fruit and vegetables a day, include whole grains    & fiber and avoid regularly eating high fat or \"junk\" foods?  No    Taking medications regularly:  Yes    Medication side effects:  None    Ability to successfully perform activities of daily living:  No assistance needed    Home Safety:  No safety concerns identified    Hearing Impairment:  No hearing concerns    In the past 6 months, have you been bothered by leaking of urine?  No    In general, how would you rate your overall mental or emotional health?  Excellent      PHQ-2 Total Score: 0    Additional concerns today:  No          Today's PHQ-2 Score:       4/12/2023     4:42 PM   PHQ-2 ( 1999 [employer])   Q1: Little interest or pleasure in doing things 0   Q2: Feeling down, depressed or hopeless 0   PHQ-2 Score 0   Q1: Little interest or pleasure in doing things Not at all    Not at all   Q2: Feeling down, depressed or hopeless Not at all    Not at all   PHQ-2 Score 0    0       Have you ever done Advance Care Planning? (For example, a Health Directive, POLST, or a discussion with a medical provider or your loved ones about your wishes): No, advance care planning information given to patient to review.  Patient declined advance care planning discussion at this time.    Social History     Tobacco Use     Smoking status: Never     Smokeless tobacco: Never   Vaping Use     Vaping status: Never Used   Substance Use Topics     Alcohol use: No             4/12/2023     4:42 PM   Alcohol Use   Prescreen: >3 drinks/day or >7 drinks/week? No " "      Last PSA: No results found for: PSA    Reviewed orders with patient. Reviewed health maintenance and updated orders accordingly - Yes  Lab work is in process    Reviewed and updated as needed this visit by clinical staff   Tobacco  Allergies  Meds              Reviewed and updated as needed this visit by Provider                   Review of Systems   Constitutional: Negative for chills and fever.   HENT: Negative for congestion, ear pain, hearing loss and sore throat.    Eyes: Negative for pain and visual disturbance.   Respiratory: Negative for cough and shortness of breath.    Cardiovascular: Negative for chest pain, palpitations and peripheral edema.   Gastrointestinal: Negative for abdominal pain, constipation, diarrhea, heartburn, hematochezia and nausea.   Genitourinary: Negative for dysuria, frequency, genital sores, hematuria, impotence, penile discharge and urgency.   Musculoskeletal: Negative for arthralgias, joint swelling and myalgias.   Skin: Negative for rash.   Neurological: Negative for dizziness, weakness, headaches and paresthesias.   Psychiatric/Behavioral: Negative for mood changes. The patient is not nervous/anxious.          OBJECTIVE:   /72   Pulse 107   Temp 98.2  F (36.8  C) (Tympanic)   Resp 16   Ht 1.826 m (5' 11.9\")   Wt 131.5 kg (289 lb 12.8 oz)   SpO2 97%   BMI 39.41 kg/m        Physical Exam  Constitutional:       General: He is not in acute distress.     Appearance: Normal appearance. He is not ill-appearing, toxic-appearing or diaphoretic.   HENT:      Head: Normocephalic and atraumatic.   Eyes:      Conjunctiva/sclera: Conjunctivae normal.   Cardiovascular:      Rate and Rhythm: Normal rate and regular rhythm.      Heart sounds: Normal heart sounds.   Pulmonary:      Effort: Pulmonary effort is normal.      Breath sounds: Normal breath sounds.   Skin:     General: Skin is warm and dry.   Neurological:      Mental Status: He is alert and oriented to person, " place, and time.   Psychiatric:         Mood and Affect: Mood normal.         Behavior: Behavior normal.         Thought Content: Thought content normal.         Judgment: Judgment normal.       Diagnostic Test Results:  Labs reviewed in Epic    ASSESSMENT/PLAN:   (Z00.00) Encounter for preventive health examination  (primary encounter diagnosis)  Comment: Pt presents for physical.     (F51.01) Primary insomnia  Comment: Symptoms well controlled with Trazodone 50MG nightly.  Plan: traZODone (DESYREL) 50 MG tablet            (F84.0) Active autistic disorder  Comment: Hx of ASD and Asperger's.     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 He had Appendicostomy placed in 2009. He puts golytely into the tube to help him have bowel movements. He follows with  [facility] once yearly.     Patient has been advised of split billing requirements and indicates understanding: Yes      COUNSELING:   Reviewed preventive health counseling, as reflected in patient instructions       Regular exercise       Healthy diet/nutrition        He reports that he has never smoked. He has never used smokeless tobacco.            TAMY Peck [facility]

## 2023-04-12 NOTE — PATIENT INSTRUCTIONS
-Would recommend getting COVID-19 Booster    -Make lab only appointment for fasting labs (10 hours prior to lab appointment, no food to eat or nothing to drink except water).       Please let me know if you have any questions.    Thanks!  TAMY Peck, CNP   [facility]

## 2023-08-09 ENCOUNTER — MYC REFILL (OUTPATIENT)
Dept: SURGERY | Facility: CLINIC | Age: 28
End: 2023-08-09
Payer: COMMERCIAL

## 2023-08-09 DIAGNOSIS — K94.19 ALTERED BOWEL ELIMINATION DUE TO INTESTINAL OSTOMY (H): ICD-10-CM

## 2023-08-09 NOTE — TELEPHONE ENCOUNTER
Refill requested for Golytely for appendicostomy tube administration. One refill sent to pharmacy. Instructed to make appointment for further refills.

## 2023-08-10 ENCOUNTER — TRANSFERRED RECORDS (OUTPATIENT)
Dept: HEALTH INFORMATION MANAGEMENT | Facility: CLINIC | Age: 28
End: 2023-08-10

## 2023-12-01 ENCOUNTER — TELEPHONE (OUTPATIENT)
Dept: SURGERY | Facility: CLINIC | Age: 28
End: 2023-12-01
Payer: COMMERCIAL

## 2023-12-07 ENCOUNTER — HOSPITAL ENCOUNTER (OUTPATIENT)
Dept: GENERAL RADIOLOGY | Facility: CLINIC | Age: 28
Discharge: HOME | End: 2023-12-07
Attending: NURSE PRACTITIONER
Payer: COMMERCIAL

## 2023-12-07 ENCOUNTER — OFFICE VISIT (OUTPATIENT)
Dept: SURGERY | Facility: CLINIC | Age: 28
End: 2023-12-07
Attending: NURSE PRACTITIONER
Payer: COMMERCIAL

## 2023-12-07 VITALS — BODY MASS INDEX: 39.27 KG/M2 | HEIGHT: 72 IN | WEIGHT: 289.9 LBS

## 2023-12-07 DIAGNOSIS — K94.19 ALTERED BOWEL ELIMINATION DUE TO INTESTINAL OSTOMY (H): ICD-10-CM

## 2023-12-07 DIAGNOSIS — K59.00 CONSTIPATION, UNSPECIFIED CONSTIPATION TYPE: Primary | ICD-10-CM

## 2023-12-07 DIAGNOSIS — K59.00 CONSTIPATION, UNSPECIFIED CONSTIPATION TYPE: ICD-10-CM

## 2023-12-07 PROCEDURE — G0463 HOSPITAL OUTPT CLINIC VISIT: HCPCS | Performed by: NURSE PRACTITIONER

## 2023-12-07 PROCEDURE — 74018 RADEX ABDOMEN 1 VIEW: CPT | Mod: 26 | Performed by: RADIOLOGY

## 2023-12-07 PROCEDURE — 99213 OFFICE O/P EST LOW 20 MIN: CPT | Performed by: NURSE PRACTITIONER

## 2023-12-07 PROCEDURE — 74018 RADEX ABDOMEN 1 VIEW: CPT

## 2023-12-07 RX ORDER — POLYETHYLENE GLYCOL 3350 17 G/17G
2 POWDER, FOR SOLUTION ORAL DAILY
Qty: 3060 G | Refills: 3 | Status: SHIPPED | OUTPATIENT
Start: 2023-12-07 | End: 2024-03-06

## 2023-12-07 NOTE — LETTER
12/7/2023      RE: Abram Fay  [address]  [place] [place] [address]     Dear Colleague,    Thank you for the opportunity to participate in the care of your patient, Abram Fay, at the [facility] at [facility]. Please see a copy of my visit note below.    D: Ramses is seen for routine bowel management follow up today and change of his appendicostomy tube.  He is accompanied by his mom, Maliha.   On review, Ramses reports that he is still using his appendicostomy to administer antegrade enemas. He usually administers 500 ml daily and has a daily bowel movement. He has been out of the Golytely that he uses for his daily ACE for several days and subsequently has not has a bowel movement for 3 days. He reports feeling bloated. No abdominal pain. No nausea or vomiting.   He continues to take 2 ex lax tablets each evening. This does not induce a spontaneous bowel movement.   On exam, Ramses has an obese abdomen. He has a 10 Tajik 7 cm AMT MiniACE appendicostomy tube in place at his umbilicus. The site is clean and dry. The tube was removed and a 10 Tajik 8 cm AMT MiniACE tube was placed. 2 ml water instilled in retention balloon.   An abdominal x-ray was done today which demonstrated abundant stool throughout the colon. These results were communicated to Ramses and his mom via telephone.   A: Ongoing constipation and need for ACE administration.   P: Bowel clean out using Golytely until clear. Administer 1 liter every 30-60 minutes until 4000 ml given or stool is clear. May repeat x1. After bowel clean out resume daily ACE Administration using 500 ml of tap water mixed with 2 capfuls of miralax. Continue Ex Lax 2 capsules every evening. Follow up if stool not clearing. Follow up in 6 months for routine tube change.     Please do not hesitate to contact me if you have any questions/concerns.     Sincerely,        TAMY RUSH CNP

## 2023-12-07 NOTE — NURSING NOTE
"[facility] [[id number]]  Chief Complaint   Patient presents with    RECHECK     Surgery follow up      Initial Ht 5' 11.89\" (182.6 cm)   Wt 289 lb 14.5 oz (131.5 kg)   BMI 39.44 kg/m   Estimated body mass index is 39.44 kg/m  as calculated from the following:    Height as of this encounter: 5' 11.89\" (182.6 cm).    Weight as of this encounter: 289 lb 14.5 oz (131.5 kg).  Medication Reconciliation: complete    Does the patient need any medication refills today? Yes    Does the patient/parent need MyChart or Proxy acces today? No    Does the patient want a flu shot today? No    Charles Bell, EMT              "

## 2023-12-08 NOTE — PATIENT INSTRUCTIONS
1. Bowel clean out using Golytely until clear. Administer 1 liter every 30-60 minutes until 4000 ml given or stool is clear. May repeat x1.   2.After bowel clean out resume daily ACE Administration using 500 ml of tap water mixed with 2 capfuls of miralax.   3.Continue Ex Lax 2 capsules every evening.   4. Follow up if stool not clearing.   5. Follow up in 6 months for routine tube change.

## 2023-12-08 NOTE — PROGRESS NOTES
D: Ramses is seen for routine bowel management follow up today and change of his appendicostomy tube.  He is accompanied by his mom, Maliha.   On review, Ramses reports that he is still using his appendicostomy to administer antegrade enemas. He usually administers 500 ml daily and has a daily bowel movement. He has been out of the Golytely that he uses for his daily ACE for several days and subsequently has not has a bowel movement for 3 days. He reports feeling bloated. No abdominal pain. No nausea or vomiting.   He continues to take 2 ex lax tablets each evening. This does not induce a spontaneous bowel movement.   On exam, Ramses has an obese abdomen. He has a 10 Slovak 7 cm AMT MiniACE appendicostomy tube in place at his umbilicus. The site is clean and dry. The tube was removed and a 10 Slovak 8 cm AMT MiniACE tube was placed. 2 ml water instilled in retention balloon.   An abdominal x-ray was done today which demonstrated abundant stool throughout the colon. These results were communicated to Ramses and his mom via telephone.   A: Ongoing constipation and need for ACE administration.   P: Bowel clean out using Golytely until clear. Administer 1 liter every 30-60 minutes until 4000 ml given or stool is clear. May repeat x1. After bowel clean out resume daily ACE Administration using 500 ml of tap water mixed with 2 capfuls of miralax. Continue Ex Lax 2 capsules every evening. Follow up if stool not clearing. Follow up in 6 months for routine tube change.

## 2023-12-10 ENCOUNTER — TELEPHONE (OUTPATIENT)
Dept: FAMILY MEDICINE | Facility: CLINIC | Age: 28
End: 2023-12-10

## 2023-12-10 DIAGNOSIS — J34.3 HYPERTROPHY OF BOTH INFERIOR NASAL TURBINATES: ICD-10-CM

## 2023-12-10 DIAGNOSIS — J34.89 NASAL OBSTRUCTION: ICD-10-CM

## 2023-12-10 DIAGNOSIS — J30.9 CHRONIC ALLERGIC RHINITIS: ICD-10-CM

## 2023-12-10 DIAGNOSIS — J34.2 DEVIATED NASAL SEPTUM: ICD-10-CM

## 2023-12-10 DIAGNOSIS — R09.81 NASAL CONGESTION: ICD-10-CM

## 2023-12-11 ENCOUNTER — TELEPHONE (OUTPATIENT)
Dept: SURGERY | Facility: CLINIC | Age: 28
End: 2023-12-11
Payer: COMMERCIAL

## 2023-12-11 NOTE — TELEPHONE ENCOUNTER
I attempted to call Ramses to follow up on bowel management. No answer. Unable to leave a vm message. I then attempted to call his mom. VM message left.

## 2024-01-16 RX ORDER — MONTELUKAST SODIUM 10 MG/1
1 TABLET ORAL AT BEDTIME
Qty: 90 TABLET | Refills: 1 | Status: SHIPPED | OUTPATIENT
Start: 2024-01-16 | End: 2024-01-16

## 2024-01-16 RX ORDER — CETIRIZINE HYDROCHLORIDE 10 MG/1
10 TABLET ORAL DAILY
Qty: 90 TABLET | Refills: 1 | Status: SHIPPED | OUTPATIENT
Start: 2024-01-16 | End: 2024-01-16

## 2024-01-16 RX ORDER — CETIRIZINE HYDROCHLORIDE 10 MG/1
10 TABLET ORAL DAILY
Qty: 90 TABLET | Refills: 1 | Status: SHIPPED | OUTPATIENT
Start: 2024-01-16 | End: 2024-04-25

## 2024-01-16 RX ORDER — MONTELUKAST SODIUM 10 MG/1
1 TABLET ORAL AT BEDTIME
Qty: 90 TABLET | Refills: 1 | Status: SHIPPED | OUTPATIENT
Start: 2024-01-16 | End: 2024-04-25

## 2024-01-16 NOTE — TELEPHONE ENCOUNTER
Received error message. The message was not sent electronically to the requested pharmacy. Contact the pharmacy about the approved prescription.     Code: 601 -  unable to process   Description Code: Code: 008 - Request timed out before response could be received.     Contacted pharmacy who  states they did not receive the prescriptions and request them to be sent again.    Medications pended.     Routing to primary care provider.     Thank you,  Sav Colunga, Triage RN [facility]  3:43 PM 1/16/2024

## 2024-04-12 ENCOUNTER — TELEPHONE (OUTPATIENT)
Dept: INTERNAL MEDICINE | Facility: CLINIC | Age: 29
End: 2024-04-12
Payer: COMMERCIAL

## 2024-04-12 DIAGNOSIS — F51.01 PRIMARY INSOMNIA: ICD-10-CM

## 2024-04-12 RX ORDER — TRAZODONE HYDROCHLORIDE 50 MG/1
TABLET, FILM COATED ORAL
Qty: 30 TABLET | Refills: 0 | Status: SHIPPED | OUTPATIENT
Start: 2024-04-12 | End: 2024-04-25

## 2024-04-18 ENCOUNTER — MYC MEDICAL ADVICE (OUTPATIENT)
Dept: INTERNAL MEDICINE | Facility: CLINIC | Age: 29
End: 2024-04-18

## 2024-04-18 ENCOUNTER — OFFICE VISIT (OUTPATIENT)
Dept: SURGERY | Facility: CLINIC | Age: 29
End: 2024-04-18
Attending: NURSE PRACTITIONER
Payer: COMMERCIAL

## 2024-04-18 ENCOUNTER — HOSPITAL ENCOUNTER (OUTPATIENT)
Facility: CLINIC | Age: 29
Discharge: HOME | End: 2024-04-18
Attending: RADIOLOGY | Admitting: RADIOLOGY
Payer: COMMERCIAL

## 2024-04-18 ENCOUNTER — APPOINTMENT (OUTPATIENT)
Dept: INTERVENTIONAL RADIOLOGY/VASCULAR | Facility: CLINIC | Age: 29
End: 2024-04-18
Attending: NURSE PRACTITIONER
Payer: COMMERCIAL

## 2024-04-18 DIAGNOSIS — K94.03 CECOSTOMY TUBE DYSFUNCTION (H): Primary | ICD-10-CM

## 2024-04-18 DIAGNOSIS — K94.03 CECOSTOMY TUBE DYSFUNCTION (H): ICD-10-CM

## 2024-04-18 PROCEDURE — C1769 GUIDE WIRE: HCPCS

## 2024-04-18 PROCEDURE — 99213 OFFICE O/P EST LOW 20 MIN: CPT | Performed by: NURSE PRACTITIONER

## 2024-04-18 PROCEDURE — 49450 REPLACE G/C TUBE PERC: CPT

## 2024-04-18 PROCEDURE — G0463 HOSPITAL OUTPT CLINIC VISIT: HCPCS | Mod: 25 | Performed by: NURSE PRACTITIONER

## 2024-04-18 PROCEDURE — 49450 REPLACE G/C TUBE PERC: CPT | Performed by: STUDENT IN AN ORGANIZED HEALTH CARE EDUCATION/TRAINING PROGRAM

## 2024-04-18 PROCEDURE — 250N000011 HC RX IP 250 OP 636: Performed by: STUDENT IN AN ORGANIZED HEALTH CARE EDUCATION/TRAINING PROGRAM

## 2024-04-18 PROCEDURE — 250N000009 HC RX 250: Performed by: STUDENT IN AN ORGANIZED HEALTH CARE EDUCATION/TRAINING PROGRAM

## 2024-04-18 PROCEDURE — C1887 CATHETER, GUIDING: HCPCS

## 2024-04-18 RX ORDER — LIDOCAINE HYDROCHLORIDE 20 MG/ML
JELLY TOPICAL ONCE
Status: COMPLETED | OUTPATIENT
Start: 2024-04-18 | End: 2024-04-18

## 2024-04-18 RX ORDER — IOPAMIDOL 612 MG/ML
15 INJECTION, SOLUTION INTRATHECAL ONCE
Status: COMPLETED | OUTPATIENT
Start: 2024-04-18 | End: 2024-04-18

## 2024-04-18 RX ADMIN — LIDOCAINE HYDROCHLORIDE: 20 JELLY TOPICAL at 10:52

## 2024-04-18 RX ADMIN — IOPAMIDOL 4 ML: 612 INJECTION, SOLUTION INTRATHECAL at 11:00

## 2024-04-18 ASSESSMENT — ACTIVITIES OF DAILY LIVING (ADL)
ADLS_ACUITY_SCORE: 35

## 2024-04-18 NOTE — LETTER
4/18/2024      RE: Abram Fay  [address]  [facility] [address]     Dear Colleague,    Thank you for the opportunity to participate in the care of your patient, Abram Fay, at the [facility] at [facility]. Please see a copy of my visit note below.    D: Ramses is seen in clinic today, accompanied by his mom, because his appendicostomy tube became dislodged and they were unable to replace it at home.   On exam, Ramses's abdomen is obese. His umbilicus is clean and dry. I attempted to intubate his appendicostomy with an 8 Telugu straight cath but was unable to visualize the appendicostomy site because of the depth of his umbilicus.   I contacted our interventional radiology staff and they agreed to see Ramses today for appendicostomy tube replacement.   Order was placed and Ramses and his mom proceeded over to radiology check in.   A: Unable to replace tube in clinic due to non visualization of site.   P: IR was able to replace tube. Will consider regular scheduled tube changes with IR in the future.     Please do not hesitate to contact me if you have any questions/concerns.     Sincerely,       TAMY RUSH CNP

## 2024-04-18 NOTE — SEDATION DOCUMENTATION
Patient Name: Abram Fay  Medical Record Number: [id number]  Today's Date: 4/18/2024    Procedure: Cecostomy tube replacement  Proceduralist: Dr. Pereyra    Procedure Start: 1051  Procedure end: 1100  Sedation medications administered: n/a     Report given to: n/a    Other Notes: Pt arrived to IR room 2 from [facility]. Procedure reviewed. Pt denies any questions or concerns regarding procedure. Pt positioned supine and monitored per protocol. Pt tolerated procedure without any noted complications. Pt transferred back to [facility] and discharged to home.

## 2024-04-18 NOTE — PROCEDURES
[facility]    Procedure: IR Procedure Note    Date/Time: 4/18/2024 11:02 AM    Performed by: Evangelist Pereyra MD  Authorized by: Evangelist Pereyra MD      UNIVERSAL PROTOCOL   Site Marked: NA  Prior Images Obtained and Reviewed:  Yes  Required items: Required blood products, implants, devices and special equipment available    Patient identity confirmed:  Verbally with patient, arm band, provided demographic data and hospital-assigned identification number  Patient was reevaluated immediately before administering moderate or deep sedation or anesthesia  Confirmation Checklist:  Patient's identity using two indicators, relevant allergies, procedure was appropriate and matched the consent or emergent situation and correct equipment/implants were available  Time out: Immediately prior to the procedure a time out was called    Universal Protocol: the Joint Commission Universal Protocol was followed    Preparation: Patient was prepped and draped in usual sterile fashion       ANESTHESIA    Anesthesia:  Local infiltration  Local Anesthetic:  Topical anesthetic      SEDATION    Patient Sedated: No    See dictated procedure note for full details.  Findings: Technically successful replacement of cecostomy tube.    Specimens: none    Complications: None    Condition: Stable    Plan: Cecostomy tube okay for immediate use      PROCEDURE    Patient Tolerance:  Patient tolerated the procedure well with no immediate complications  Length of time physician/provider present for 1:1 monitoring during sedation: 0

## 2024-04-18 NOTE — PROGRESS NOTES
D: Ramses is seen in clinic today, accompanied by his mom, because his appendicostomy tube became dislodged and they were unable to replace it at home.   On exam, Ramses's abdomen is obese. His umbilicus is clean and dry. I attempted to intubate his appendicostomy with an 8 Wolof straight cath but was unable to visualize the appendicostomy site because of the depth of his umbilicus.   I contacted our interventional radiology staff and they agreed to see Ramses today for appendicostomy tube replacement.   Order was placed and Ramses and his mom proceeded over to radiology check in.   A: Unable to replace tube in clinic due to non visualization of site.   P: IR was able to replace tube. Will consider regular scheduled tube changes with IR in the future.

## 2024-04-23 NOTE — TELEPHONE ENCOUNTER
Placed the [facility] [employer] [facility] section forms at the .   Pt NEEDS to complete BEFORE his appt.     Cyndy has the Health Profession section forms to fill out at the appt on Thursday with pt.

## 2024-04-23 NOTE — TELEPHONE ENCOUNTER
Cyndy states there are a lot of questions that the pt needs to answer PRIOR to his appointment on Thursday.   Sent message back to have them answer the first 2 parts of the form.   Form is on [employer] RN desk, if we need to put at the front for pt or Mom.

## 2024-04-25 ENCOUNTER — VIRTUAL VISIT (OUTPATIENT)
Dept: INTERNAL MEDICINE | Facility: CLINIC | Age: 29
End: 2024-04-25
Payer: COMMERCIAL

## 2024-04-25 DIAGNOSIS — F51.01 PRIMARY INSOMNIA: ICD-10-CM

## 2024-04-25 DIAGNOSIS — R09.81 NASAL CONGESTION: ICD-10-CM

## 2024-04-25 DIAGNOSIS — J34.3 HYPERTROPHY OF BOTH INFERIOR NASAL TURBINATES: ICD-10-CM

## 2024-04-25 DIAGNOSIS — J30.9 CHRONIC ALLERGIC RHINITIS: ICD-10-CM

## 2024-04-25 DIAGNOSIS — F84.0 ACTIVE AUTISTIC DISORDER: Primary | ICD-10-CM

## 2024-04-25 DIAGNOSIS — G43.009 MIGRAINE WITHOUT AURA AND WITHOUT STATUS MIGRAINOSUS, NOT INTRACTABLE: ICD-10-CM

## 2024-04-25 DIAGNOSIS — J34.89 NASAL OBSTRUCTION: ICD-10-CM

## 2024-04-25 DIAGNOSIS — J34.2 DEVIATED NASAL SEPTUM: ICD-10-CM

## 2024-04-25 PROCEDURE — 99214 OFFICE O/P EST MOD 30 MIN: CPT | Mod: 95

## 2024-04-25 RX ORDER — CETIRIZINE HYDROCHLORIDE 10 MG/1
10 TABLET ORAL DAILY
Qty: 90 TABLET | Refills: 3 | Status: SHIPPED | OUTPATIENT
Start: 2024-04-25

## 2024-04-25 RX ORDER — SUMATRIPTAN 50 MG/1
50 TABLET, FILM COATED ORAL
Qty: 30 TABLET | Refills: 2 | Status: SHIPPED | OUTPATIENT
Start: 2024-04-25 | End: 2024-09-24

## 2024-04-25 RX ORDER — TRAZODONE HYDROCHLORIDE 50 MG/1
TABLET, FILM COATED ORAL
Qty: 90 TABLET | Refills: 3 | Status: SHIPPED | OUTPATIENT
Start: 2024-04-25

## 2024-04-25 RX ORDER — MONTELUKAST SODIUM 10 MG/1
1 TABLET ORAL AT BEDTIME
Qty: 90 TABLET | Refills: 3 | Status: SHIPPED | OUTPATIENT
Start: 2024-04-25

## 2024-04-25 NOTE — PROGRESS NOTES
"Ramses is a 29 year old who is being evaluated via a billable video visit.    How would you like to obtain your AVS? MyChart  If the video visit is dropped, the invitation should be resent by: Text to cell phone: [phone]  Will anyone else be joining your video visit? No      Assessment & Plan       He endorses stomach pain over the past 7 days.  He denies any symptoms of fevers, constipation or diarrhea. Tums have helped a little bit.  We discussed if symptoms do not improve that he should make an in person appt for this as it is difficult to assess stomach pain over a virtual visit. He can see me or dionisio next week in person if needed.         (F84.0) Active autistic disorder  (primary encounter diagnosis)  Comment: We filled out [employer] mobility paperwork today-   Plan:     (J34.89) Nasal obstruction  Comment:   Plan: cetirizine (ZYRTEC) 10 MG tablet, montelukast         (SINGULAIR) 10 MG tablet            (R09.81) Nasal congestion  Comment:   Plan: cetirizine (ZYRTEC) 10 MG tablet, montelukast         (SINGULAIR) 10 MG tablet            (J30.9) Chronic allergic rhinitis  Comment:   Plan: cetirizine (ZYRTEC) 10 MG tablet, montelukast         (SINGULAIR) 10 MG tablet            (J34.2) Deviated nasal septum  Comment:   Plan: cetirizine (ZYRTEC) 10 MG tablet, montelukast         (SINGULAIR) 10 MG tablet            (J34.3) Hypertrophy of both inferior nasal turbinates  Comment:   Plan: cetirizine (ZYRTEC) 10 MG tablet, montelukast         (SINGULAIR) 10 MG tablet          (F51.01) Primary insomnia  Comment:   Plan: traZODone (DESYREL) 50 MG tablet            (G43.009) Migraine without aura and without status migrainosus, not intractable  Comment:   Plan: SUMAtriptan (IMITREX) 50 MG tablet      BMI  Estimated body mass index is 39.44 kg/m  as calculated from the following:    Height as of 12/7/23: 1.826 m (5' 11.89\").    Weight as of 12/7/23: 131.5 kg (289 lb 14.5 oz).                     Subjective   Ramses is a 29 " year old, presenting for the following health issues:  RECHECK (Follow up meds. Mobility caitlin, stomach bug, intense stomachache pain level 3-7/10)      4/25/2024    12:49 PM   Additional Questions   Roomed by Jimmy   Accompanied by self     Video Start Time:  3:35    HPI           Objective           Vitals:  No vitals were obtained today due to virtual visit.    Physical Exam   GENERAL: alert and no distress  RESP: No audible wheeze, cough, or visible cyanosis.    SKIN: Visible skin clear. No significant rash, abnormal pigmentation or lesions.  NEURO: Cranial nerves grossly intact.  Mentation and speech appropriate for age.  PSYCH: Appropriate affect, tone, and pace of words          Video-Visit Details    Type of service:  Video Visit   Video End Time: 3:55 PM  Originating Location (pt. Location): Home    Distant Location (provider location):  On-site  Platform used for Video Visit: Micaela  Signed Electronically by: TAMY Peck CNP

## 2024-04-29 NOTE — TELEPHONE ENCOUNTER
See his Cherry message. Will send message to schedule with Dionisio on Wed or Thursday.       Per Cyndy's VV note:   Assessment & Plan  He endorses stomach pain over the past 7 days.  He denies any symptoms of fevers, constipation or diarrhea. Tums have helped a little bit.  We discussed if symptoms do not improve that he should make an in person appt for this as it is difficult to assess stomach pain over a virtual visit. He can see me or dionisio next week in person if needed.

## 2024-05-01 ENCOUNTER — OFFICE VISIT (OUTPATIENT)
Dept: INTERNAL MEDICINE | Facility: CLINIC | Age: 29
End: 2024-05-01
Payer: COMMERCIAL

## 2024-05-01 ENCOUNTER — ANCILLARY PROCEDURE (OUTPATIENT)
Dept: GENERAL RADIOLOGY | Facility: CLINIC | Age: 29
End: 2024-05-01
Payer: COMMERCIAL

## 2024-05-01 VITALS
TEMPERATURE: 98.5 F | HEART RATE: 117 BPM | RESPIRATION RATE: 22 BRPM | DIASTOLIC BLOOD PRESSURE: 70 MMHG | BODY MASS INDEX: 41.45 KG/M2 | OXYGEN SATURATION: 98 % | HEIGHT: 72 IN | WEIGHT: 306 LBS | SYSTOLIC BLOOD PRESSURE: 130 MMHG

## 2024-05-01 DIAGNOSIS — F84.0 ACTIVE AUTISTIC DISORDER: ICD-10-CM

## 2024-05-01 DIAGNOSIS — R10.84 ABDOMINAL PAIN, GENERALIZED: ICD-10-CM

## 2024-05-01 DIAGNOSIS — R10.84 ABDOMINAL PAIN, GENERALIZED: Primary | ICD-10-CM

## 2024-05-01 DIAGNOSIS — K94.19 ALTERED BOWEL ELIMINATION DUE TO INTESTINAL OSTOMY (H): ICD-10-CM

## 2024-05-01 PROCEDURE — 74019 RADEX ABDOMEN 2 VIEWS: CPT | Mod: TC | Performed by: STUDENT IN AN ORGANIZED HEALTH CARE EDUCATION/TRAINING PROGRAM

## 2024-05-01 PROCEDURE — 99214 OFFICE O/P EST MOD 30 MIN: CPT

## 2024-05-01 RX ORDER — POLYETHYLENE GLYCOL 3350 17 G/17G
POWDER, FOR SOLUTION ORAL
COMMUNITY
Start: 2024-03-09

## 2024-05-01 NOTE — NURSING NOTE
"BP (!) 146/100   Pulse 117   Temp 98.5  F (36.9  C) (Tympanic)   Resp 22   Ht 1.816 m (5' 11.5\")   Wt 138.8 kg (306 lb)   SpO2 98%   BMI 42.08 kg/m      "

## 2024-05-01 NOTE — PROGRESS NOTES
Assessment & Plan     (R10.84) Abdominal pain, generalized  (primary encounter diagnosis)  Comment: Patient presents to the clinic with a chief complaint of generalized abdominal pain over the last 2 weeks.  Patient states that he has been using Tums and Pepto-Bismol quite frequently but has also had episodes of nausea vomiting and diarrhea.  He suspects that this is a stomach bug however since been going on for 2 weeks he is very concerned that this could be colon cancer.  Given his history of severe constipation and diarrhea we will obtain an abdominal x-ray and will also test his stool for bacteria and viruses.  Patient verbalized understanding.  Plan: XR Abdomen 2 Views, Enteric Bacteria and Virus         Panel by FORTUNATO Stool        Imaging pending.  Labs pending.    (F84.0) Active autistic disorder  Comment: Patient currently living in a group home that specializes in active autistic disorder.        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  Plan: Abdominal x-ray pending.      30 minutes spent by me on the date of the encounter doing chart review, review of test results, interpretation of tests, patient visit, and documentation             Subjective   Ramses is a 29 year old, presenting for the following health issues: Patient has been dealing with a stomach aches for the last couple of weeks. His abdominal pain will come and go in intensity. Some night he will even wake up in the night. He has had some nausea/vomiting and some diarrhea.   He also has had some dry heaving episodes.   He has been taking tums and pepto bismol.- taking that about 4 to 6 a day. He tries to take only 4 a day.   Still feeling the abdominal pain.   Changes to diet: eating the same, but it does effect him foods like hamburger helper. Used to be a  comfort food for him and now he can only eat half of it then he has to stop. But other foods like pizza and lunchables have been okay.   Bowel movements: Normal with stomach medications.   Recent abx: not for a long time  No changes to medications  One-two months ago, his stomach specialist had him start trying miralax. So he is doing that through his anupama tube. But that has been working great.   He saw his stomach doctor about this last week and since it had only been about a week, they weren't too concerned about it. His stomach button had fallen out so they replaced that.       Consult (Ongoing stomach issues)      5/1/2024    12:40 PM   Additional Questions   Roomed by yang   Accompanied by self         5/1/2024    12:40 PM   Patient Reported Additional Medications   Patient reports taking the following new medications none     History of Present Illness       Reason for visit:  I have a stomach bug that has lasted for two weeks, so i'm following up to see if there's something else going on.    He eats 0-1 servings of fruits and vegetables daily.He consumes 3 sweetened beverage(s) daily.He exercises with enough effort to increase his heart rate 9 or less minutes per day.  He exercises with enough effort to increase his heart rate 3 or less days per week.   He is taking medications regularly.               Review of Systems  Constitutional, HEENT, cardiovascular, pulmonary, gi and gu systems are negative, except as otherwise noted.      Objective    There were no vitals taken for this visit.  There is no height or weight on file to calculate BMI.  Physical Exam   GENERAL: alert and no distress  NECK: no adenopathy, no asymmetry, masses, or scars  RESP: lungs clear to auscultation - no rales, rhonchi or wheezes  CV: regular rate and rhythm, normal S1 S2, no S3 or S4, no murmur, click or rub, no peripheral edema  ABDOMEN: soft, nontender, no hepatosplenomegaly, no masses and bowel sounds normal  MS: no gross  musculoskeletal defects noted, no edema            Signed Electronically by: TAMY Matson CNP

## 2024-05-02 LAB
ADV 40+41 DNA STL QL NAA+NON-PROBE: NEGATIVE
ASTRO TYP 1-8 RNA STL QL NAA+NON-PROBE: NEGATIVE
C CAYETANENSIS DNA STL QL NAA+NON-PROBE: NEGATIVE
CAMPYLOBACTER DNA SPEC NAA+PROBE: NEGATIVE
CRYPTOSP DNA STL QL NAA+NON-PROBE: NEGATIVE
E COLI O157 DNA STL QL NAA+NON-PROBE: NORMAL
E HISTOLYT DNA STL QL NAA+NON-PROBE: NEGATIVE
EAEC ASTA GENE ISLT QL NAA+PROBE: NEGATIVE
EC STX1+STX2 GENES STL QL NAA+NON-PROBE: NEGATIVE
EPEC EAE GENE STL QL NAA+NON-PROBE: NEGATIVE
ETEC LTA+ST1A+ST1B TOX ST NAA+NON-PROBE: NEGATIVE
G LAMBLIA DNA STL QL NAA+NON-PROBE: NEGATIVE
NOROVIRUS GI+II RNA STL QL NAA+NON-PROBE: NEGATIVE
P SHIGELLOIDES DNA STL QL NAA+NON-PROBE: NEGATIVE
RVA RNA STL QL NAA+NON-PROBE: NEGATIVE
SALMONELLA SP RPOD STL QL NAA+PROBE: NEGATIVE
SAPO I+II+IV+V RNA STL QL NAA+NON-PROBE: NEGATIVE
SHIGELLA SP+EIEC IPAH ST NAA+NON-PROBE: NEGATIVE
V CHOLERAE DNA SPEC QL NAA+PROBE: NEGATIVE
VIBRIO DNA SPEC NAA+PROBE: NEGATIVE
Y ENTEROCOL DNA STL QL NAA+PROBE: NEGATIVE

## 2024-05-02 PROCEDURE — 87507 IADNA-DNA/RNA PROBE TQ 12-25: CPT

## 2024-05-09 ENCOUNTER — TRANSFERRED RECORDS (OUTPATIENT)
Dept: HEALTH INFORMATION MANAGEMENT | Facility: CLINIC | Age: 29
End: 2024-05-09
Payer: COMMERCIAL

## 2024-05-29 DIAGNOSIS — K59.00 CONSTIPATION, UNSPECIFIED CONSTIPATION TYPE: ICD-10-CM

## 2024-05-29 DIAGNOSIS — K94.19 ALTERED BOWEL ELIMINATION DUE TO INTESTINAL OSTOMY (H): ICD-10-CM

## 2024-05-29 NOTE — PROGRESS NOTES
Request for updated DME orders received via [employer].   Orders faxed to [facility].

## 2024-05-30 ENCOUNTER — TRANSFERRED RECORDS (OUTPATIENT)
Dept: HEALTH INFORMATION MANAGEMENT | Facility: CLINIC | Age: 29
End: 2024-05-30
Payer: COMMERCIAL

## 2024-06-22 ENCOUNTER — HEALTH MAINTENANCE LETTER (OUTPATIENT)
Age: 29
End: 2024-06-22

## 2024-08-14 DIAGNOSIS — K94.19 ALTERED BOWEL ELIMINATION DUE TO INTESTINAL OSTOMY (H): Primary | ICD-10-CM

## 2024-09-04 ENCOUNTER — MYC MEDICAL ADVICE (OUTPATIENT)
Dept: SURGERY | Facility: CLINIC | Age: 29
End: 2024-09-04
Payer: COMMERCIAL

## 2024-09-04 ENCOUNTER — TELEPHONE (OUTPATIENT)
Dept: SURGERY | Facility: CLINIC | Age: 29
End: 2024-09-04

## 2024-09-04 DIAGNOSIS — K94.19 ALTERED BOWEL ELIMINATION DUE TO INTESTINAL OSTOMY (H): Primary | ICD-10-CM

## 2024-09-04 NOTE — TELEPHONE ENCOUNTER
[facility]    Phone Message    May a detailed message be left on voicemail: yes     Reason for Call: Other: Patient's mother returning call from care team. No note in chart besides the one listed prior. Informed mother she would be marked for call back to get in touch with care team. Thank you.      Action Taken: Other: PEDS SURG    Travel Screening: Not Applicable     Date of Service: 09/04/24

## 2024-09-04 NOTE — TELEPHONE ENCOUNTER
[facility]    Phone Message    May a detailed message be left on voicemail: yes     Reason for Call: Other: Patient's Mom called regarding her [employer]hart message she sent in today about patient's tube falling out and not knowing if they should reschedule patient's endoscopy tomorrow. Please call mom back. Thanks.     Action Taken: Other: Peds Surgery    Travel Screening: Not Applicable

## 2024-09-05 ENCOUNTER — HOSPITAL ENCOUNTER (OUTPATIENT)
Facility: CLINIC | Age: 29
Discharge: HOME | End: 2024-09-05
Attending: SURGERY | Admitting: PHYSICIAN ASSISTANT
Payer: COMMERCIAL

## 2024-09-05 ENCOUNTER — APPOINTMENT (OUTPATIENT)
Dept: INTERVENTIONAL RADIOLOGY/VASCULAR | Facility: CLINIC | Age: 29
End: 2024-09-05
Attending: NURSE PRACTITIONER
Payer: COMMERCIAL

## 2024-09-05 DIAGNOSIS — K94.19 ALTERED BOWEL ELIMINATION DUE TO INTESTINAL OSTOMY (H): ICD-10-CM

## 2024-09-05 PROCEDURE — C1887 CATHETER, GUIDING: HCPCS

## 2024-09-05 PROCEDURE — 49450 REPLACE G/C TUBE PERC: CPT | Performed by: PHYSICIAN ASSISTANT

## 2024-09-05 PROCEDURE — C1769 GUIDE WIRE: HCPCS

## 2024-09-05 PROCEDURE — 49450 REPLACE G/C TUBE PERC: CPT

## 2024-09-05 RX ORDER — LIDOCAINE HYDROCHLORIDE 20 MG/ML
JELLY TOPICAL ONCE
Status: DISCONTINUED | OUTPATIENT
Start: 2024-09-05 | End: 2024-09-10 | Stop reason: HOSPADM

## 2024-09-05 ASSESSMENT — ACTIVITIES OF DAILY LIVING (ADL)
ADLS_ACUITY_SCORE: 35

## 2024-09-05 NOTE — IR NOTE
Patient Name: Abram Fay  Medical Record Number: [id number]  Today's Date: 2024    Procedure: Cecostomy tube change  Proceduralist: David Lancaster PA-C  Pathology present: NA    Procedure Start: 123  Procedure end: 1258  Sedation medications administered: local lidocaine       Replacement miniACE button 10F x 9CM provided by GI department.  The button has a current expiration date of 2024.  Patient in agreement to utilize  tube for exchange.    Urine sample shows possible UTI.  Prescription sent for Bactrim pending culture result.  If she is having significant symptoms go to urgent care or ER.

## 2024-09-24 DIAGNOSIS — G43.009 MIGRAINE WITHOUT AURA AND WITHOUT STATUS MIGRAINOSUS, NOT INTRACTABLE: ICD-10-CM

## 2024-09-24 RX ORDER — SUMATRIPTAN 50 MG/1
TABLET, FILM COATED ORAL
Qty: 18 TABLET | Refills: 4 | Status: SHIPPED | OUTPATIENT
Start: 2024-09-24

## 2024-10-03 ENCOUNTER — TRANSFERRED RECORDS (OUTPATIENT)
Dept: HEALTH INFORMATION MANAGEMENT | Facility: CLINIC | Age: 29
End: 2024-10-03
Payer: COMMERCIAL

## 2024-10-24 ENCOUNTER — TRANSFERRED RECORDS (OUTPATIENT)
Dept: HEALTH INFORMATION MANAGEMENT | Facility: CLINIC | Age: 29
End: 2024-10-24
Payer: COMMERCIAL

## 2025-01-06 ENCOUNTER — OFFICE VISIT (OUTPATIENT)
Dept: URGENT CARE | Facility: URGENT CARE | Age: 30
End: 2025-01-06
Payer: COMMERCIAL

## 2025-01-06 VITALS
RESPIRATION RATE: 20 BRPM | DIASTOLIC BLOOD PRESSURE: 80 MMHG | OXYGEN SATURATION: 98 % | SYSTOLIC BLOOD PRESSURE: 132 MMHG | TEMPERATURE: 98.8 F | HEART RATE: 82 BPM

## 2025-01-06 DIAGNOSIS — J01.90 ACUTE SINUSITIS WITH COEXISTING CONDITION REQUIRING PROPHYLACTIC TREATMENT: ICD-10-CM

## 2025-01-06 DIAGNOSIS — J04.0 LARYNGITIS: ICD-10-CM

## 2025-01-06 DIAGNOSIS — R05.8 PRODUCTIVE COUGH: Primary | ICD-10-CM

## 2025-01-06 PROCEDURE — 99213 OFFICE O/P EST LOW 20 MIN: CPT | Performed by: FAMILY MEDICINE

## 2025-01-06 RX ORDER — DOXYCYCLINE 100 MG/1
100 TABLET ORAL 2 TIMES DAILY
Qty: 14 TABLET | Refills: 0 | Status: SHIPPED | OUTPATIENT
Start: 2025-01-06 | End: 2025-01-13

## 2025-01-06 NOTE — PROGRESS NOTES
SUBJECTIVE: Abram Fay is a 29 year old male presenting with a chief complaint of nasal congestion and cough .  Onset of symptoms was 2 week(s) ago.      Past Medical History:   Diagnosis Date    Autistic disorder, current or active state     Constipation     Encopresis(307.7)     Muscle tone, decreased      Allergies   Allergen Reactions    No Known Allergies      Social History     Tobacco Use    Smoking status: Never    Smokeless tobacco: Never   Substance Use Topics    Alcohol use: No       ROS:  SKIN: no rash  GI: no vomiting    OBJECTIVE:  /80   Pulse 82   Temp 98.8  F (37.1  C) (Tympanic)   Resp 20   SpO2 98% GENERAL APPEARANCE: healthy, alert and no distress  EYES: EOMI,  PERRL, conjunctiva clear  HENT: ear canals and TM's normal.  Nose and mouth without ulcers, erythema or lesions  RESP: lungs clear to auscultation - no rales, rhonchi or wheezes  SKIN: no suspicious lesions or rashes      ICD-10-CM    1. Productive cough  R05.8 doxycycline monohydrate (ADOXA) 100 MG tablet      2. Acute sinusitis with coexisting condition requiring prophylactic treatment  J01.90 doxycycline monohydrate (ADOXA) 100 MG tablet      3. Laryngitis  J04.0           Fluids/Rest, f/u if worse/not any better

## 2025-01-06 NOTE — LETTER
January 6, 2025      Abram Fay  [address]  [facility] [place] [facility] [address]        To Whom It May Concern:    Abram Fay was seen in our clinic. He may return to work tomorrow if improved without restrictions.      Sincerely,        Bienvenido Ni, DO    Electronically signed

## 2025-02-11 ENCOUNTER — MYC MEDICAL ADVICE (OUTPATIENT)
Dept: INTERNAL MEDICINE | Facility: CLINIC | Age: 30
End: 2025-02-11
Payer: COMMERCIAL

## 2025-02-17 ENCOUNTER — OFFICE VISIT (OUTPATIENT)
Dept: URGENT CARE | Facility: URGENT CARE | Age: 30
End: 2025-02-17
Payer: COMMERCIAL

## 2025-02-17 ENCOUNTER — ANCILLARY PROCEDURE (OUTPATIENT)
Dept: GENERAL RADIOLOGY | Facility: CLINIC | Age: 30
End: 2025-02-17
Attending: FAMILY MEDICINE
Payer: COMMERCIAL

## 2025-02-17 VITALS
SYSTOLIC BLOOD PRESSURE: 169 MMHG | TEMPERATURE: 98.5 F | DIASTOLIC BLOOD PRESSURE: 101 MMHG | WEIGHT: 311 LBS | HEART RATE: 78 BPM | OXYGEN SATURATION: 98 % | BODY MASS INDEX: 42.77 KG/M2 | RESPIRATION RATE: 18 BRPM

## 2025-02-17 DIAGNOSIS — R05.1 ACUTE COUGH: ICD-10-CM

## 2025-02-17 DIAGNOSIS — J04.0 LARYNGITIS: Primary | ICD-10-CM

## 2025-02-17 LAB — WBC # BLD AUTO: 7.8 10E3/UL (ref 4–11)

## 2025-02-17 PROCEDURE — 36415 COLL VENOUS BLD VENIPUNCTURE: CPT | Performed by: FAMILY MEDICINE

## 2025-02-17 PROCEDURE — 85048 AUTOMATED LEUKOCYTE COUNT: CPT | Performed by: FAMILY MEDICINE

## 2025-02-17 PROCEDURE — 71046 X-RAY EXAM CHEST 2 VIEWS: CPT | Mod: TC | Performed by: RADIOLOGY

## 2025-02-17 PROCEDURE — 99214 OFFICE O/P EST MOD 30 MIN: CPT | Performed by: FAMILY MEDICINE

## 2025-02-17 NOTE — PROGRESS NOTES
SUBJECTIVE: Abram Fay is a 29 year old male presenting with a chief complaint of cough  and laryngitis.  Onset of symptoms was 1 week(s) ago.  Predisposing factors include None.    Past Medical History:   Diagnosis Date    Autistic disorder, current or active state     Constipation     Encopresis(307.7)     Muscle tone, decreased      Allergies   Allergen Reactions    No Known Allergies      Social History     Tobacco Use    Smoking status: Never    Smokeless tobacco: Never   Substance Use Topics    Alcohol use: No       ROS:  SKIN: no rash  GI: no vomiting    OBJECTIVE:  BP (!) 169/101 (BP Location: Left arm, Patient Position: Sitting, Cuff Size: Adult Large)   Pulse 78   Temp 98.5  F (36.9  C) (Oral)   Resp 18   Wt (!) 141.1 kg (311 lb)   SpO2 98%   BMI 42.77 kg/m  GENERAL APPEARANCE: healthy, alert and no distress  EYES: EOMI,  PERRL, conjunctiva clear  HENT: ear canals and TM's normal.  Nose and mouth without ulcers, erythema or lesions  RESP: lungs clear to auscultation - no rales, rhonchi or wheezes  SKIN: no suspicious lesions or rashes    Xray without acute findings, no pneumonia read by Bienvenido Ni D.O.      ICD-10-CM    1. Laryngitis  J04.0 WBC count      2. Acute cough  R05.1 XR Chest 2 Views          Fluids/Rest, f/u if worse/not any better

## 2025-02-17 NOTE — LETTER
February 17, 2025      Abram Fay  [address]  [facility] [address]        To Whom It May Concern:    Abram Fay was seen in our clinic. He may return to work after laryngitis resolves without restrictions.      Sincerely,        Bienvenido Ni, DO    Electronically signed

## 2025-02-24 ENCOUNTER — VIRTUAL VISIT (OUTPATIENT)
Dept: INTERNAL MEDICINE | Facility: CLINIC | Age: 30
End: 2025-02-24
Payer: COMMERCIAL

## 2025-02-24 DIAGNOSIS — J02.9 SORE THROAT: ICD-10-CM

## 2025-02-24 DIAGNOSIS — J04.0 LARYNGITIS: Primary | ICD-10-CM

## 2025-02-24 PROCEDURE — 98005 SYNCH AUDIO-VIDEO EST LOW 20: CPT

## 2025-02-24 RX ORDER — METHYLPREDNISOLONE 4 MG/1
TABLET ORAL
Qty: 21 TABLET | Refills: 0 | Status: SHIPPED | OUTPATIENT
Start: 2025-02-24

## 2025-02-24 NOTE — PROGRESS NOTES
Ramses is a 29 year old who is being evaluated via a billable video visit.    How would you like to obtain your AVS? MyChart  If the video visit is dropped, the invitation should be resent by: Text to cell phone: [phone]  Will anyone else be joining your video visit? No      Father with diagnosed with strep a few days ago , sx onset 2/11/25   Horse voice , sore throat . No noted fevers . Was also ill at beginning of December     Assessment & Plan     (J04.0) Laryngitis  (primary encounter diagnosis)  Comment:   Chief Complaint:  Sore throat and hoarseness over the past two weeks.    History of Present Illness:  The patient presents with a two-week history of sore throat and hoarseness. Approximately two months ago, the patient experienced similar symptoms, including sore throat, hoarseness, rhinitis, and nasal congestion. He was evaluated on January 6th and treated for presumed sinusitis with doxycycline, after which he noted initial symptom improvement. However, 2-3 weeks post-treatment, the sore throat and hoarseness recurred.    On February 17th, the patient was seen in urgent care, where a complete blood count was performed, revealing a normal white blood cell count. A chest X-ray was also conducted, which was negative. Recently, the patient has been in contact with his father, who was treated for strep throat.    The patient has attempted to rest his voice and reports mild discomfort when swallowing, but no significant pain. He denies wheezing or shortness of breath but mentions an occasional cough.    Past Medical History:  No significant past medical history reported.    Medications:  Recently completed doxycycline for presumed sinusitis.      Social History:  Recent exposure to a family member treated for strep throat.    Review of Systems:  HEENT: Sore throat, hoarseness, mild discomfort with swallowing, no significant pain with swallowing, occasional cough.  Respiratory: No wheezing or shortness of  "breath.  General: No fever reported.      Assessment and Plan:  Laryngitis  Prescribe Medrol Dosepak to reduce inflammation.  Recommend daily use of Flonase for potential postnasal drip contributing to symptoms.  Rule Out Strep Throat      Follow-Up  Monitor symptoms over the next 1-2 weeks.  If symptoms do not resolve or improve, refer to ENT for further evaluation.    Instructions to Patient:  Rest voice as much as possible.  Use Flonase daily as directed.  Return for follow-up if symptoms persist or worsen.  Call the office with any new or worsening symptoms.        Plan: methylPREDNISolone (MEDROL DOSEPAK) 4 MG tablet        therapy pack            (J02.9) Sore throat  Comment:   Plan: Streptococcus A Rapid Screen w/Reflex to PCR -         Clinic Collect            The longitudinal plan of care for the diagnosis(es)/condition(s) as documented were addressed during this visit. Due to the added complexity in care, I will continue to support Ramses in the subsequent management and with ongoing continuity of care.        BMI  Estimated body mass index is 42.77 kg/m  as calculated from the following:    Height as of 5/1/24: 1.816 m (5' 11.5\").    Weight as of 2/17/25: 141.1 kg (311 lb).   Weight management plan: Discussed healthy diet and exercise guidelines                  Subjective   Ramses is a 29 year old, presenting for the following health issues:  URI      2/24/2025     2:01 PM   Additional Questions   Roomed by Kylie     Video Start Time: 2:45 PM    URI                     Objective           Vitals:  No vitals were obtained today due to virtual visit.    Physical Exam   GENERAL: alert and no distress, voice is hoarse   EYES: Eyes grossly normal to inspection.  No discharge or erythema, or obvious scleral/conjunctival abnormalities.  RESP: No audible wheeze, cough, or visible cyanosis.    SKIN: Visible skin clear. No significant rash, abnormal pigmentation or lesions.  NEURO: Cranial nerves grossly intact.  " Mentation and speech appropriate for age.  PSYCH: Appropriate affect, tone, and pace of words            Video-Visit Details    Type of service:  Video Visit   Video End Time:3:00 PM  Originating Location (pt. Location): Home    Distant Location (provider location):  Off-site  Platform used for Video Visit: Micaela  Signed Electronically by: TAMY Peck CNP

## 2025-02-25 ENCOUNTER — LAB (OUTPATIENT)
Dept: LAB | Facility: CLINIC | Age: 30
End: 2025-02-25
Payer: COMMERCIAL

## 2025-02-25 DIAGNOSIS — J02.9 SORE THROAT: ICD-10-CM

## 2025-02-25 LAB
DEPRECATED S PYO AG THROAT QL EIA: NEGATIVE
S PYO DNA THROAT QL NAA+PROBE: NOT DETECTED

## 2025-02-25 PROCEDURE — 87651 STREP A DNA AMP PROBE: CPT

## 2025-04-21 ENCOUNTER — TRANSFERRED RECORDS (OUTPATIENT)
Dept: GASTROENTEROLOGY | Facility: HOSPITAL | Age: 30
End: 2025-04-21
Payer: COMMERCIAL

## 2025-04-22 NOTE — PROCEDURES
2025        Abram Rendon) Rigos II   [address]  [place] [address]      Abram Rendon) Nya II,  :  1995    I am writing to let you know the results of the tests that were done the other day.   Thank you for allowing [facility] the opportunity to take part in your healthcare.  At [facility] we strive to provide each patient with the finest gastroenterology care available.  We hope your experience was pleasant and informative.    Your Hemoglobin and iron levels are still somewhat low. I would recommend that you get IV iron infusions. I will place an order for this to be done. See you in May for the endoscopy.  Ferritin 2025 14:00   Description Result Units Flags Range   Ferritin 14 ng/mL L    Comments   Performed At: , [facility] [address], [place], [place], [id number]  Funmilayo Sharif MD, Phone: [phone]   Vitamin B12 2025 14:00   Description Result Units Flags Range   Vitamin B12 895 pg/mL  232-1245   Comments   Performed At: , [facility] [place] [address], [place], [place], [id number]  Funmilayo Sharif MD, Phone: [phone]   Vitamin D, 25-Hydroxy 2025 14:00   Description Result Units Flags Range   Vitamin D, 25-Hydroxy 33.6 ng/mL  30.0-100.0   Comments   Performed At: , [facility] [address], [place], [place], [id number]  Funmilayo Sharif MD, Phone: [phone]   Vitamin D, 25-Hydroxy:  Vitamin D deficiency has been defined by the [place] of  Medicine and an Endocrine Society practice guideline as a  level of serum 25-OH vitamin D less than 20 ng/mL (1,2).  The Endocrine Society went on to further define vitamin D  insufficiency as a level between 21 and 29 ng/mL (2).  1. IOM ([place] of [employer]). 2010. Dietary reference     intakes for calcium and D. [place]: The     National Academies Press.  2. Patricia MF, Acosta NC, Shoshana BROOKS, et al.     Evaluation, treatment, and prevention of vitamin D     deficiency: an Endocrine  Society clinical practice     guideline. JCEM. 2011 Jul; 96(7):1911-30.   Folate (Folic Acid), Serum 04/17/2025 14:00   Description Result Units Flags Range   Folate (Folic Acid), Serum 7.7 ng/mL  >3.0   Comments   Performed At: [facility] [place] [address], [place], [place], [id number]  Funmilayo Sharif MD, Phone: [phone]   Folate (Folic Acid), Serum:  A serum folate concentration of less than 3.1 ng/mL is  considered to represent clinical deficiency.   Iron and TIBC 04/17/2025 14:00   Description Result Units Flags Range   Iron 32 ug/dL L    Iron Bind.Cap.(TIBC) 396 ug/dL  250-450   Iron Saturation 8 % LL 15-55   UIBC 364 ug/dL H 111-343   Comments   Performed At: [facility] [place] [address], [place], [place], [id number]  Funmilayo Sharif MD, Phone: [phone]   CBC With Differential/Platelet 04/17/2025 14:00   Description Result Units Flags Range   Hemoglobin 12.1 g/dL L 13.0-17.7   Hematocrit 42.0 %  37.5-51.0   MCV 73 fL L 79-97   MCHC 28.8 g/dL L 31.5-35.7   MCH 21.0 pg L 26.6-33.0   RDW 16.5 % H 11.6-15.4   Platelets 422 x10E3/uL  150-450   Neutrophils 49 %  Not Estab.   Lymphs 29 %  Not Estab.   Monocytes 8 %  Not Estab.   Eos 13 %  Not Estab.   Basos 1 %  Not Estab.   Neutrophils (Absolute) 4.1 x10E3/uL  1.4-7.0   Lymphs (Absolute) 2.5 x10E3/uL  0.7-3.1   Monocytes(Absolute) 0.7 x10E3/uL  0.1-0.9   Eos (Absolute) 1.1 x10E3/uL H 0.0-0.4   Baso (Absolute) 0.1 x10E3/uL  0.0-0.2   Immature Grans (Abs) 0.0 x10E3/uL  0.0-0.1   Immature Granulocytes 0 %  Not Estab.   RBC 5.75 x10E6/uL  4.14-5.80   WBC 8.6 x10E3/uL  3.4-10.8   Comments   First Available              Performed At: , [facility] [place] [address], [place], [place], [id number]  Funmilayo Sharif MD, Phone: [phone]         I hope you are feeling well.  If symptoms persist or if you have questions regarding your results, please call our office.       Thank you.    Electronically signed by:  Radha Asencio MD 04/21/2025 11:24 AM  Document  generated by:  Radha Asencio MD  04/21/2025  If your provider ordered multiple tests; the results may not become available at the same time.  If multiple test results are received within 14 days of one another, you may receive a duplicate.  cc:  Cyndy Holbrook NP

## 2025-04-24 ENCOUNTER — OFFICE VISIT (OUTPATIENT)
Dept: INTERNAL MEDICINE | Facility: CLINIC | Age: 30
End: 2025-04-24
Payer: COMMERCIAL

## 2025-04-24 VITALS
OXYGEN SATURATION: 97 % | HEART RATE: 79 BPM | SYSTOLIC BLOOD PRESSURE: 133 MMHG | WEIGHT: 280 LBS | TEMPERATURE: 97.9 F | DIASTOLIC BLOOD PRESSURE: 82 MMHG | BODY MASS INDEX: 38.51 KG/M2

## 2025-04-24 DIAGNOSIS — E66.09 CLASS 2 OBESITY DUE TO EXCESS CALORIES WITHOUT SERIOUS COMORBIDITY WITH BODY MASS INDEX (BMI) OF 35.0 TO 35.9 IN ADULT: ICD-10-CM

## 2025-04-24 DIAGNOSIS — E66.812 CLASS 2 OBESITY DUE TO EXCESS CALORIES WITHOUT SERIOUS COMORBIDITY WITH BODY MASS INDEX (BMI) OF 35.0 TO 35.9 IN ADULT: ICD-10-CM

## 2025-04-24 DIAGNOSIS — K90.0 CELIAC DISEASE: ICD-10-CM

## 2025-04-24 DIAGNOSIS — Z01.818 PREOP GENERAL PHYSICAL EXAM: Primary | ICD-10-CM

## 2025-04-24 NOTE — H&P (VIEW-ONLY)
Preoperative Evaluation  [address] [address]  Phone: [phone]  Primary Provider: TAMY Peck CNP  Pre-op Performing Provider: Jorgito Chamberlain MD  Apr 24, 2025 4/24/2025   Surgical Information   What procedure is being done? upper endoscopy   Facility or Hospital where procedure/surgery will be performed: [facility] in [place]   Who is doing the procedure / surgery? Dr. Cross   Date of surgery / procedure: Thursday, May 8th   Time of surgery / procedure: 12pm est.   Where do you plan to recover after surgery? at home alone     Fax number for surgical facility: Note does not need to be faxed, will be available electronically in Epic.    Assessment & Plan     The proposed surgical procedure is considered LOW risk.    Preop general physical exam      Celiac disease      Class 2 obesity due to excess calories without serious comorbidity with body mass index (BMI) of 35.0 to 35.9 in adult                - No identified additional risk factors other than previously addressed    Preoperative Medication Instructions  Antiplatelet or Anticoagulation Medication Instructions   - We reviewed the medication list and the patient is not on an antiplatelet or anticoagulation medications.    Additional Medication Instructions  Take all scheduled medications on the day of surgery EXCEPT for modifications listed below:   - Herbal medications and vitamins: DO NOT TAKE 14 days prior to surgery.    Recommendation  Approval given to proceed with proposed procedure, without further diagnostic evaluation.        Iris Pearce is a 30 year old, presenting for the following:  Pre-Op Exam        HPI:                  4/24/2025   Pre-Op Questionnaire   Have you ever had a heart attack or stroke? No   Have you ever had surgery on your heart or blood vessels, such as a stent placement, a coronary artery bypass, or surgery on an artery in  your head, neck, heart, or legs? No   Do you have chest pain with activity? No   Do you have a history of heart failure? No   Do you currently have a cold, bronchitis or symptoms of other infection? No   Do you have a cough, shortness of breath, or wheezing? No   Do you or anyone in your family have previous history of blood clots? No   Do you or does anyone in your family have a serious bleeding problem such as prolonged bleeding following surgeries or cuts? No   Have you ever had problems with anemia or been told to take iron pills? No   Have you had any abnormal blood loss such as black, tarry or bloody stools? No   Have you ever had a blood transfusion? No   Are you willing to have a blood transfusion if it is medically needed before, during, or after your surgery? Yes   Have you or any of your relatives ever had problems with anesthesia? No   Do you have sleep apnea, excessive snoring or daytime drowsiness? No   Do you have any artifical heart valves or other implanted medical devices like a pacemaker, defibrillator, or continuous glucose monitor? No   Do you have artificial joints? No   Are you allergic to latex? 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 Provider to review      Active autistic disorder 12/17/2002     Priority: Medium     Problem  list name updated by automated process. Provider to review        Past Medical History:   Diagnosis Date    Autistic disorder, current or active state     Constipation     Encopresis(307.7)     Muscle tone, decreased      Past Surgical History:   Procedure Laterality Date    CREATE STOMA ANTEGRADE COLONIC ENEMA  7/28/2011    Procedure:CREATE STOMA ANTEGRADE COLONIC ENEMA; Replacement of Appendicostomy  Tube; Surgeon:ALIA BREWSTER; Location:[facility]    IR CECOSTOMY COLONIC TUBE REPLACEMENT  4/18/2024    IR CECOSTOMY COLONIC TUBE REPLACEMENT  9/5/2024    PLACEMENT APPENDICOSTOMY      ZZHC CREATE EARDRUM OPENING,GEN ANESTH      3 SETS PE TUBES     Current Outpatient Medications   Medication Sig Dispense Refill    cetirizine (ZYRTEC) 10 MG tablet Take 1 tablet (10 mg) by mouth daily 90 tablet 3    diphenhydrAMINE (BENADRYL) 25 MG capsule Take 25 mg by mouth every 6 hours as needed for itching or allergies.      MELATONIN PO Take by mouth nightly as needed.      methylPREDNISolone (MEDROL DOSEPAK) 4 MG tablet therapy pack Follow Package Directions 21 tablet 0    montelukast (SINGULAIR) 10 MG tablet Take 1 tablet (10 mg) by mouth at bedtime (Patient not taking: Reported on 2/24/2025) 90 tablet 3    Multiple vitamin TABS Take by mouth daily      omeprazole (PRILOSEC) 20 MG DR capsule TAKE 1 CAPSULE BY MOUTH 2 TIMES EVERY DAY 30 MINUTES TO 1 HOUR BEFORE A MEAL      order for DME Equipment being ordered: 1. AMT MiniAce Button. 12 french x 7 cm to have spare tube at home.   2. 1000ml gravity feeding bag for enema administration 2 per month  3. Right angle extension set for Mini ACE. 2 per month 1 Month 11    ORDER FOR DME AMT mini-one gastrostomy tube buttons 1 Device 2    polyethylene glycol (GOLYTELY) 236 g suspension Mix as directed. Administer 500 ml via appendicostomy daily. 44823 mL 0    polyethylene glycol (MIRALAX) 17 GM/Dose powder 34 G (2 CAPFULS) BY ORAL OR FEEDING TUBE ROUTE DAILY FOR 90 DAYS      Sennosides  "(EX-LAX PO) Take by mouth daily as needed      SUMAtriptan (IMITREX) 50 MG tablet TAKE 1 TABLET BY MOUTH AT ONSET OF HEADACHE FOR MIGRANE MAY REPEAT IN 2 HOURS MAX 4 TABLETS/24 HOURS 18 tablet 4    traZODone (DESYREL) 50 MG tablet TAKE 1 TABLET BY MOUTH EVERYDAY AT BEDTIME 90 tablet 3       Allergies   Allergen Reactions    No Known Allergies         Social History     Tobacco Use    Smoking status: Never    Smokeless tobacco: Never   Substance Use Topics    Alcohol use: No       History   Drug Use No             Review of Systems  Constitutional, HEENT, cardiovascular, pulmonary, gi and gu systems are negative, except as otherwise noted.    Objective    There were no vitals taken for this visit.   Estimated body mass index is 42.77 kg/m  as calculated from the following:    Height as of 5/1/24: 1.816 m (5' 11.5\").    Weight as of 2/17/25: 141.1 kg (311 lb).  Physical Exam  GENERAL: alert and no distress  NECK: no adenopathy, no asymmetry, masses, or scars  RESP: lungs clear to auscultation - no rales, rhonchi or wheezes  CV: regular rate and rhythm, normal S1 S2, no S3 or S4, no murmur, click or rub, no peripheral edema  ABDOMEN: soft, nontender, no hepatosplenomegaly, no masses and bowel sounds normal  MS: no gross musculoskeletal defects noted, no edema    No results for input(s): \"HGB\", \"PLT\", \"INR\", \"NA\", \"POTASSIUM\", \"CR\", \"A1C\" in the last 8760 hours.     Diagnostics  No labs were ordered during this visit.   No EKG required for low risk surgery (cataract, skin procedure, breast biopsy, etc).    Revised Cardiac Risk Index (RCRI)  The patient has the following serious cardiovascular risks for perioperative complications:   - No serious cardiac risks = 0 points     RCRI Interpretation: 0 points: Class I (very low risk - 0.4% complication rate)         Signed Electronically by: Jorgito Chamberlain MD  A copy of this evaluation report is provided to the requesting physician.         "

## 2025-04-24 NOTE — PROGRESS NOTES
Preoperative Evaluation  [address] [address]  Phone: [phone]  Primary Provider: TAMY Peck CNP  Pre-op Performing Provider: Jorgito Chamberlain MD  Apr 24, 2025 4/24/2025   Surgical Information   What procedure is being done? upper endoscopy   Facility or Hospital where procedure/surgery will be performed: [facility] in [place]   Who is doing the procedure / surgery? Dr. Cross   Date of surgery / procedure: Thursday, May 8th   Time of surgery / procedure: 12pm est.   Where do you plan to recover after surgery? at home alone     Fax number for surgical facility: Note does not need to be faxed, will be available electronically in Epic.    Assessment & Plan     The proposed surgical procedure is considered LOW risk.    Preop general physical exam      Celiac disease      Class 2 obesity due to excess calories without serious comorbidity with body mass index (BMI) of 35.0 to 35.9 in adult                - No identified additional risk factors other than previously addressed    Preoperative Medication Instructions  Antiplatelet or Anticoagulation Medication Instructions   - We reviewed the medication list and the patient is not on an antiplatelet or anticoagulation medications.    Additional Medication Instructions  Take all scheduled medications on the day of surgery EXCEPT for modifications listed below:   - Herbal medications and vitamins: DO NOT TAKE 14 days prior to surgery.    Recommendation  Approval given to proceed with proposed procedure, without further diagnostic evaluation.        Iris Pearce is a 30 year old, presenting for the following:  Pre-Op Exam        HPI:                  4/24/2025   Pre-Op Questionnaire   Have you ever had a heart attack or stroke? No   Have you ever had surgery on your heart or blood vessels, such as a stent placement, a coronary artery bypass, or surgery on an artery in  your head, neck, heart, or legs? No   Do you have chest pain with activity? No   Do you have a history of heart failure? No   Do you currently have a cold, bronchitis or symptoms of other infection? No   Do you have a cough, shortness of breath, or wheezing? No   Do you or anyone in your family have previous history of blood clots? No   Do you or does anyone in your family have a serious bleeding problem such as prolonged bleeding following surgeries or cuts? No   Have you ever had problems with anemia or been told to take iron pills? No   Have you had any abnormal blood loss such as black, tarry or bloody stools? No   Have you ever had a blood transfusion? No   Are you willing to have a blood transfusion if it is medically needed before, during, or after your surgery? Yes   Have you or any of your relatives ever had problems with anesthesia? No   Do you have sleep apnea, excessive snoring or daytime drowsiness? No   Do you have any artifical heart valves or other implanted medical devices like a pacemaker, defibrillator, or continuous glucose monitor? No   Do you have artificial joints? No   Are you allergic to latex? 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 Provider to review      Active autistic disorder 12/17/2002     Priority: Medium     Problem  list name updated by automated process. Provider to review        Past Medical History:   Diagnosis Date    Autistic disorder, current or active state     Constipation     Encopresis(307.7)     Muscle tone, decreased      Past Surgical History:   Procedure Laterality Date    CREATE STOMA ANTEGRADE COLONIC ENEMA  7/28/2011    Procedure:CREATE STOMA ANTEGRADE COLONIC ENEMA; Replacement of Appendicostomy  Tube; Surgeon:ALIA BREWSTER; Location[facility]    IR CECOSTOMY COLONIC TUBE REPLACEMENT  4/18/2024    IR CECOSTOMY COLONIC TUBE REPLACEMENT  9/5/2024    PLACEMENT APPENDICOSTOMY      ZZHC CREATE EARDRUM OPENING,GEN ANESTH      3 SETS PE TUBES     Current Outpatient Medications   Medication Sig Dispense Refill    cetirizine (ZYRTEC) 10 MG tablet Take 1 tablet (10 mg) by mouth daily 90 tablet 3    diphenhydrAMINE (BENADRYL) 25 MG capsule Take 25 mg by mouth every 6 hours as needed for itching or allergies.      MELATONIN PO Take by mouth nightly as needed.      methylPREDNISolone (MEDROL DOSEPAK) 4 MG tablet therapy pack Follow Package Directions 21 tablet 0    montelukast (SINGULAIR) 10 MG tablet Take 1 tablet (10 mg) by mouth at bedtime (Patient not taking: Reported on 2/24/2025) 90 tablet 3    Multiple vitamin TABS Take by mouth daily      omeprazole (PRILOSEC) 20 MG DR capsule TAKE 1 CAPSULE BY MOUTH 2 TIMES EVERY DAY 30 MINUTES TO 1 HOUR BEFORE A MEAL      order for DME Equipment being ordered: 1. AMT MiniAce Button. 12 french x 7 cm to have spare tube at home.   2. 1000ml gravity feeding bag for enema administration 2 per month  3. Right angle extension set for Mini ACE. 2 per month 1 Month 11    ORDER FOR DME AMT mini-one gastrostomy tube buttons 1 Device 2    polyethylene glycol (GOLYTELY) 236 g suspension Mix as directed. Administer 500 ml via appendicostomy daily. 55772 mL 0    polyethylene glycol (MIRALAX) 17 GM/Dose powder 34 G (2 CAPFULS) BY ORAL OR FEEDING TUBE ROUTE DAILY FOR 90 DAYS      Sennosides  "(EX-LAX PO) Take by mouth daily as needed      SUMAtriptan (IMITREX) 50 MG tablet TAKE 1 TABLET BY MOUTH AT ONSET OF HEADACHE FOR MIGRANE MAY REPEAT IN 2 HOURS MAX 4 TABLETS/24 HOURS 18 tablet 4    traZODone (DESYREL) 50 MG tablet TAKE 1 TABLET BY MOUTH EVERYDAY AT BEDTIME 90 tablet 3       Allergies   Allergen Reactions    No Known Allergies         Social History     Tobacco Use    Smoking status: Never    Smokeless tobacco: Never   Substance Use Topics    Alcohol use: No       History   Drug Use No             Review of Systems  Constitutional, HEENT, cardiovascular, pulmonary, gi and gu systems are negative, except as otherwise noted.    Objective    There were no vitals taken for this visit.   Estimated body mass index is 42.77 kg/m  as calculated from the following:    Height as of 5/1/24: 1.816 m (5' 11.5\").    Weight as of 2/17/25: 141.1 kg (311 lb).  Physical Exam  GENERAL: alert and no distress  NECK: no adenopathy, no asymmetry, masses, or scars  RESP: lungs clear to auscultation - no rales, rhonchi or wheezes  CV: regular rate and rhythm, normal S1 S2, no S3 or S4, no murmur, click or rub, no peripheral edema  ABDOMEN: soft, nontender, no hepatosplenomegaly, no masses and bowel sounds normal  MS: no gross musculoskeletal defects noted, no edema    No results for input(s): \"HGB\", \"PLT\", \"INR\", \"NA\", \"POTASSIUM\", \"CR\", \"A1C\" in the last 8760 hours.     Diagnostics  No labs were ordered during this visit.   No EKG required for low risk surgery (cataract, skin procedure, breast biopsy, etc).    Revised Cardiac Risk Index (RCRI)  The patient has the following serious cardiovascular risks for perioperative complications:   - No serious cardiac risks = 0 points     RCRI Interpretation: 0 points: Class I (very low risk - 0.4% complication rate)         Signed Electronically by: Jorgito Chamberlain MD  A copy of this evaluation report is provided to the requesting physician.         "

## 2025-04-24 NOTE — PATIENT INSTRUCTIONS
How to Take Your Medication Before Surgery  Preoperative Medication Instructions   Antiplatelet or Anticoagulation Medication Instructions   - We reviewed the medication list and the patient is not on an antiplatelet or anticoagulation medications.    Additional Medication Instructions  Take all scheduled medications on the day of surgery EXCEPT for modifications listed below:   - Herbal medications and vitamins: DO NOT TAKE 14 days prior to surgery.       Patient Education   Preparing for Your Surgery  For Adults  Getting started  In most cases, a nurse will call to review your health history and instructions. They will give you an arrival time based on your scheduled surgery time. Please be ready to share:  Your doctor's clinic name and phone number  Your medical, surgical, and anesthesia history  A list of allergies and sensitivities  A list of medicines, including herbal treatments and over-the-counter drugs  Whether the patient has a legal guardian (ask how to send us the papers in advance)  Note: You may not receive a call if you were seen at our PAC (Preoperative Assessment Center).  Please tell us if you're pregnant--or if there's any chance you might be pregnant. Some surgeries may injure a fetus (unborn baby), so they require a pregnancy test. Surgeries that are safe for a fetus don't always need a test, and you can choose whether to have one.   Preparing for surgery  Within 10 to 30 days of surgery: Have a pre-op exam (sometimes called an H&P, or History and Physical). This can be done at a clinic or pre-operative center.  If you're having a , you may not need this exam. Talk to your care team.  At your pre-op exam, talk to your care team about all medicines you take. (This includes CBD oil and any drugs, such as THC, marijuana, and other forms of cannabis.) If you need to stop any medicine before surgery, ask when to start taking it again.  This is for your safety. Many medicines and drugs can  make you bleed too much during surgery. Some change how well surgery (anesthesia) drugs work.  Call your insurance company to let them know you're having surgery. (If you don't have insurance, call [phone].)  Call your clinic if there's any change in your health. This includes a scrape or scratch near the surgery site, or any signs of a cold (sore throat, runny nose, cough, rash, fever).  Eating and drinking guidelines  For your safety: Unless your surgeon tells you otherwise, follow the guidelines below.  Eat and drink as normal until 8 hours before you arrive for surgery. After that, no food or milk. You can spit out gum when you arrive.  Drink clear liquids until 2 hours before you arrive. These are liquids you can see through, like water, Gatorade, and Propel Water. They also include plain black coffee and tea (no cream or milk).  No alcohol for 24 hours before you arrive. The night before surgery, stop any drinks that contain THC.  If your care team tells you to take medicine on the morning of surgery, it's okay to take it with a sip of water. No other medicines or drugs are allowed (including CBD oil)--follow your care team's instructions.  If you have questions the day of surgery, call your hospital or surgery center.   Preventing infection  Shower or bathe the night before and the morning of surgery. Follow the instructions your clinic gave you. (If no instructions, use regular soap.)  Don't shave or clip hair near your surgery site. We'll remove the hair if needed.  Don't smoke or vape the morning of surgery. No chewing tobacco for 6 hours before you arrive. A nicotine patch is okay. You may spit out nicotine gum when you arrive.  For some surgeries, the surgeon will tell you to fully quit smoking and nicotine.  We will make every effort to keep you safe from infection. We will:  Clean our hands often with soap and water (or an alcohol-based hand rub).  Clean the skin at your surgery site with a  special soap that kills germs.  Give you a special gown to keep you warm. (Cold raises the risk of infection.)  Wear hair covers, masks, gowns, and gloves during surgery.  Give antibiotic medicine, if prescribed. Not all surgeries need this medicine.  What to bring on the day of surgery  Photo ID and insurance card  Copy of your health care directive, if you have one  Glasses and hearing aids (bring cases)  You can't wear contacts during surgery  Inhaler and eye drops, if you use them (tell us about these when you arrive)  CPAP machine or breathing device, if you use them  A few personal items, if spending the night  If you have . . .  A pacemaker, ICD (cardiac defibrillator), or other implant: Bring the ID card.  An implanted stimulator: Bring the remote control.  A legal guardian: Bring a copy of the certified (court-stamped) guardianship papers.  Please remove any jewelry, including body piercings. Leave jewelry and other valuables at home.  If you're going home the day of surgery  You must have a responsible adult drive you home. They should stay with you overnight as well.  If you don't have someone to stay with you, and you aren't safe to go home alone, we may keep you overnight. Insurance often won't pay for this.  After surgery  If it's hard to control your pain or you need more pain medicine, please call your surgeon's office.  Questions?   If you have any questions for your care team, list them here:   ____________________________________________________________________________________________________________________________________________________________________________________________________________________________________________________________  For informational purposes only. Not to replace the advice of your health care provider. Copyright   2003, 2019 [facility]. All rights reserved. Clinically reviewed by Jax Han MD. [employer] [id number] - REV 08/24.     Patient Education    Preparing for Your Surgery  For Adults  Getting started  In most cases, a nurse will call to review your health history and instructions. They will give you an arrival time based on your scheduled surgery time. Please be ready to share:  Your doctor's clinic name and phone number  Your medical, surgical, and anesthesia history  A list of allergies and sensitivities  A list of medicines, including herbal treatments and over-the-counter drugs  Whether the patient has a legal guardian (ask how to send us the papers in advance)  Note: You may not receive a call if you were seen at our PAC (Preoperative Assessment Center).  Please tell us if you're pregnant--or if there's any chance you might be pregnant. Some surgeries may injure a fetus (unborn baby), so they require a pregnancy test. Surgeries that are safe for a fetus don't always need a test, and you can choose whether to have one.   Preparing for surgery  Within 10 to 30 days of surgery: Have a pre-op exam (sometimes called an H&P, or History and Physical). This can be done at a clinic or pre-operative center.  If you're having a , you may not need this exam. Talk to your care team.  At your pre-op exam, talk to your care team about all medicines you take. (This includes CBD oil and any drugs, such as THC, marijuana, and other forms of cannabis.) If you need to stop any medicine before surgery, ask when to start taking it again.  This is for your safety. Many medicines and drugs can make you bleed too much during surgery. Some change how well surgery (anesthesia) drugs work.  Call your insurance company to let them know you're having surgery. (If you don't have insurance, call [phone].)  Call your clinic if there's any change in your health. This includes a scrape or scratch near the surgery site, or any signs of a cold (sore throat, runny nose, cough, rash, fever).  Eating and drinking guidelines  For your safety: Unless your surgeon tells you  otherwise, follow the guidelines below.  Eat and drink as normal until 8 hours before you arrive for surgery. After that, no food or milk. You can spit out gum when you arrive.  Drink clear liquids until 2 hours before you arrive. These are liquids you can see through, like water, Gatorade, and Propel Water. They also include plain black coffee and tea (no cream or milk).  No alcohol for 24 hours before you arrive. The night before surgery, stop any drinks that contain THC.  If your care team tells you to take medicine on the morning of surgery, it's okay to take it with a sip of water. No other medicines or drugs are allowed (including CBD oil)--follow your care team's instructions.  If you have questions the day of surgery, call your hospital or surgery center.   Preventing infection  Shower or bathe the night before and the morning of surgery. Follow the instructions your clinic gave you. (If no instructions, use regular soap.)  Don't shave or clip hair near your surgery site. We'll remove the hair if needed.  Don't smoke or vape the morning of surgery. No chewing tobacco for 6 hours before you arrive. A nicotine patch is okay. You may spit out nicotine gum when you arrive.  For some surgeries, the surgeon will tell you to fully quit smoking and nicotine.  We will make every effort to keep you safe from infection. We will:  Clean our hands often with soap and water (or an alcohol-based hand rub).  Clean the skin at your surgery site with a special soap that kills germs.  Give you a special gown to keep you warm. (Cold raises the risk of infection.)  Wear hair covers, masks, gowns, and gloves during surgery.  Give antibiotic medicine, if prescribed. Not all surgeries need this medicine.  What to bring on the day of surgery  Photo ID and insurance card  Copy of your health care directive, if you have one  Glasses and hearing aids (bring cases)  You can't wear contacts during surgery  Inhaler and eye drops, if you  use them (tell us about these when you arrive)  CPAP machine or breathing device, if you use them  A few personal items, if spending the night  If you have . . .  A pacemaker, ICD (cardiac defibrillator), or other implant: Bring the ID card.  An implanted stimulator: Bring the remote control.  A legal guardian: Bring a copy of the certified (court-stamped) guardianship papers.  Please remove any jewelry, including body piercings. Leave jewelry and other valuables at home.  If you're going home the day of surgery  You must have a responsible adult drive you home. They should stay with you overnight as well.  If you don't have someone to stay with you, and you aren't safe to go home alone, we may keep you overnight. Insurance often won't pay for this.  After surgery  If it's hard to control your pain or you need more pain medicine, please call your surgeon's office.  Questions?   If you have any questions for your care team, list them here:   ____________________________________________________________________________________________________________________________________________________________________________________________________________________________________________________________  For informational purposes only. Not to replace the advice of your health care provider. Copyright   2003, 2019 [employer]. All rights reserved. Clinically reviewed by Jax Han MD. [employer] [id number] - REV 08/24.

## 2025-05-03 DIAGNOSIS — F51.01 PRIMARY INSOMNIA: ICD-10-CM

## 2025-05-05 RX ORDER — TRAZODONE HYDROCHLORIDE 50 MG/1
TABLET ORAL
Qty: 90 TABLET | Refills: 2 | Status: SHIPPED | OUTPATIENT
Start: 2025-05-05

## 2025-05-07 ENCOUNTER — ANESTHESIA EVENT (OUTPATIENT)
Dept: SURGERY | Facility: CLINIC | Age: 30
End: 2025-05-07
Payer: COMMERCIAL

## 2025-05-08 ENCOUNTER — HOSPITAL ENCOUNTER (OUTPATIENT)
Facility: CLINIC | Age: 30
Discharge: HOME | End: 2025-05-08
Attending: INTERNAL MEDICINE | Admitting: INTERNAL MEDICINE
Payer: COMMERCIAL

## 2025-05-08 ENCOUNTER — ANESTHESIA (OUTPATIENT)
Dept: SURGERY | Facility: CLINIC | Age: 30
End: 2025-05-08
Payer: COMMERCIAL

## 2025-05-08 VITALS
TEMPERATURE: 97.7 F | WEIGHT: 310.2 LBS | SYSTOLIC BLOOD PRESSURE: 118 MMHG | RESPIRATION RATE: 16 BRPM | BODY MASS INDEX: 44.41 KG/M2 | OXYGEN SATURATION: 97 % | HEART RATE: 84 BPM | HEIGHT: 70 IN | DIASTOLIC BLOOD PRESSURE: 56 MMHG

## 2025-05-08 LAB — UPPER GI ENDOSCOPY: NORMAL

## 2025-05-08 PROCEDURE — 710N000012 HC RECOVERY PHASE 2, PER MINUTE: Performed by: INTERNAL MEDICINE

## 2025-05-08 PROCEDURE — 258N000003 HC RX IP 258 OP 636: Performed by: ANESTHESIOLOGY

## 2025-05-08 PROCEDURE — 360N000075 HC SURGERY LEVEL 2, PER MIN: Performed by: INTERNAL MEDICINE

## 2025-05-08 PROCEDURE — C1769 GUIDE WIRE: HCPCS | Performed by: INTERNAL MEDICINE

## 2025-05-08 PROCEDURE — 258N000003 HC RX IP 258 OP 636: Performed by: NURSE ANESTHETIST, CERTIFIED REGISTERED

## 2025-05-08 PROCEDURE — 250N000009 HC RX 250: Performed by: NURSE ANESTHETIST, CERTIFIED REGISTERED

## 2025-05-08 PROCEDURE — 999N000141 HC STATISTIC PRE-PROCEDURE NURSING ASSESSMENT: Performed by: INTERNAL MEDICINE

## 2025-05-08 PROCEDURE — 88305 TISSUE EXAM BY PATHOLOGIST: CPT | Mod: TC | Performed by: INTERNAL MEDICINE

## 2025-05-08 PROCEDURE — 250N000011 HC RX IP 250 OP 636: Performed by: NURSE ANESTHETIST, CERTIFIED REGISTERED

## 2025-05-08 PROCEDURE — 272N000001 HC OR GENERAL SUPPLY STERILE: Performed by: INTERNAL MEDICINE

## 2025-05-08 PROCEDURE — 370N000017 HC ANESTHESIA TECHNICAL FEE, PER MIN: Performed by: INTERNAL MEDICINE

## 2025-05-08 PROCEDURE — 710N000010 HC RECOVERY PHASE 1, LEVEL 2, PER MIN: Performed by: INTERNAL MEDICINE

## 2025-05-08 RX ORDER — NALOXONE HYDROCHLORIDE 0.4 MG/ML
0.2 INJECTION, SOLUTION INTRAMUSCULAR; INTRAVENOUS; SUBCUTANEOUS
Status: DISCONTINUED | OUTPATIENT
Start: 2025-05-08 | End: 2025-05-08 | Stop reason: HOSPADM

## 2025-05-08 RX ORDER — ONDANSETRON 4 MG/1
4 TABLET, ORALLY DISINTEGRATING ORAL EVERY 30 MIN PRN
Status: DISCONTINUED | OUTPATIENT
Start: 2025-05-08 | End: 2025-05-08 | Stop reason: HOSPADM

## 2025-05-08 RX ORDER — OXYCODONE HYDROCHLORIDE 5 MG/1
10 TABLET ORAL
Status: DISCONTINUED | OUTPATIENT
Start: 2025-05-08 | End: 2025-05-08 | Stop reason: HOSPADM

## 2025-05-08 RX ORDER — NALOXONE HYDROCHLORIDE 0.4 MG/ML
0.4 INJECTION, SOLUTION INTRAMUSCULAR; INTRAVENOUS; SUBCUTANEOUS
Status: DISCONTINUED | OUTPATIENT
Start: 2025-05-08 | End: 2025-05-08 | Stop reason: HOSPADM

## 2025-05-08 RX ORDER — OXYCODONE HYDROCHLORIDE 5 MG/1
5 TABLET ORAL
Status: DISCONTINUED | OUTPATIENT
Start: 2025-05-08 | End: 2025-05-08 | Stop reason: HOSPADM

## 2025-05-08 RX ORDER — SIMETHICONE 40MG/0.6ML
133 SUSPENSION, DROPS(FINAL DOSAGE FORM)(ML) ORAL
Status: DISCONTINUED | OUTPATIENT
Start: 2025-05-08 | End: 2025-05-08 | Stop reason: HOSPADM

## 2025-05-08 RX ORDER — EPINEPHRINE 1 MG/ML
0.1 INJECTION, SOLUTION, CONCENTRATE INTRAVENOUS
Status: DISCONTINUED | OUTPATIENT
Start: 2025-05-08 | End: 2025-05-08 | Stop reason: HOSPADM

## 2025-05-08 RX ORDER — FLUMAZENIL 0.1 MG/ML
0.2 INJECTION, SOLUTION INTRAVENOUS
Status: DISCONTINUED | OUTPATIENT
Start: 2025-05-08 | End: 2025-05-08 | Stop reason: HOSPADM

## 2025-05-08 RX ORDER — NALOXONE HYDROCHLORIDE 0.4 MG/ML
0.1 INJECTION, SOLUTION INTRAMUSCULAR; INTRAVENOUS; SUBCUTANEOUS
Status: DISCONTINUED | OUTPATIENT
Start: 2025-05-08 | End: 2025-05-08 | Stop reason: HOSPADM

## 2025-05-08 RX ORDER — PROCHLORPERAZINE MALEATE 10 MG
10 TABLET ORAL EVERY 6 HOURS PRN
Status: DISCONTINUED | OUTPATIENT
Start: 2025-05-08 | End: 2025-05-08 | Stop reason: HOSPADM

## 2025-05-08 RX ORDER — PROPOFOL 10 MG/ML
INJECTION, EMULSION INTRAVENOUS PRN
Status: DISCONTINUED | OUTPATIENT
Start: 2025-05-08 | End: 2025-05-08

## 2025-05-08 RX ORDER — LIDOCAINE 40 MG/G
CREAM TOPICAL
Status: DISCONTINUED | OUTPATIENT
Start: 2025-05-08 | End: 2025-05-08 | Stop reason: HOSPADM

## 2025-05-08 RX ORDER — SODIUM CHLORIDE, SODIUM LACTATE, POTASSIUM CHLORIDE, CALCIUM CHLORIDE 600; 310; 30; 20 MG/100ML; MG/100ML; MG/100ML; MG/100ML
INJECTION, SOLUTION INTRAVENOUS CONTINUOUS
Status: DISCONTINUED | OUTPATIENT
Start: 2025-05-08 | End: 2025-05-08 | Stop reason: HOSPADM

## 2025-05-08 RX ORDER — LIDOCAINE HYDROCHLORIDE 10 MG/ML
INJECTION, SOLUTION INFILTRATION; PERINEURAL PRN
Status: DISCONTINUED | OUTPATIENT
Start: 2025-05-08 | End: 2025-05-08

## 2025-05-08 RX ORDER — ONDANSETRON 4 MG/1
4 TABLET, ORALLY DISINTEGRATING ORAL EVERY 6 HOURS PRN
Status: DISCONTINUED | OUTPATIENT
Start: 2025-05-08 | End: 2025-05-08 | Stop reason: HOSPADM

## 2025-05-08 RX ORDER — FENTANYL CITRATE 50 UG/ML
25-100 INJECTION, SOLUTION INTRAMUSCULAR; INTRAVENOUS EVERY 5 MIN PRN
Refills: 0 | Status: DISCONTINUED | OUTPATIENT
Start: 2025-05-08 | End: 2025-05-08 | Stop reason: HOSPADM

## 2025-05-08 RX ORDER — DEXAMETHASONE SODIUM PHOSPHATE 4 MG/ML
4 INJECTION, SOLUTION INTRA-ARTICULAR; INTRALESIONAL; INTRAMUSCULAR; INTRAVENOUS; SOFT TISSUE
Status: DISCONTINUED | OUTPATIENT
Start: 2025-05-08 | End: 2025-05-08 | Stop reason: HOSPADM

## 2025-05-08 RX ORDER — ONDANSETRON 2 MG/ML
4 INJECTION INTRAMUSCULAR; INTRAVENOUS EVERY 30 MIN PRN
Status: DISCONTINUED | OUTPATIENT
Start: 2025-05-08 | End: 2025-05-08 | Stop reason: HOSPADM

## 2025-05-08 RX ORDER — ONDANSETRON 2 MG/ML
4 INJECTION INTRAMUSCULAR; INTRAVENOUS EVERY 6 HOURS PRN
Status: DISCONTINUED | OUTPATIENT
Start: 2025-05-08 | End: 2025-05-08 | Stop reason: HOSPADM

## 2025-05-08 RX ORDER — ATROPINE SULFATE 0.1 MG/ML
1 INJECTION INTRAVENOUS
Status: DISCONTINUED | OUTPATIENT
Start: 2025-05-08 | End: 2025-05-08 | Stop reason: HOSPADM

## 2025-05-08 RX ORDER — DIPHENHYDRAMINE HYDROCHLORIDE 50 MG/ML
25-50 INJECTION, SOLUTION INTRAMUSCULAR; INTRAVENOUS
Status: DISCONTINUED | OUTPATIENT
Start: 2025-05-08 | End: 2025-05-08 | Stop reason: HOSPADM

## 2025-05-08 RX ORDER — PROPOFOL 10 MG/ML
INJECTION, EMULSION INTRAVENOUS CONTINUOUS PRN
Status: DISCONTINUED | OUTPATIENT
Start: 2025-05-08 | End: 2025-05-08

## 2025-05-08 RX ORDER — ONDANSETRON 2 MG/ML
4 INJECTION INTRAMUSCULAR; INTRAVENOUS
Status: DISCONTINUED | OUTPATIENT
Start: 2025-05-08 | End: 2025-05-08 | Stop reason: HOSPADM

## 2025-05-08 RX ADMIN — PROPOFOL 50 MG: 10 INJECTION, EMULSION INTRAVENOUS at 11:43

## 2025-05-08 RX ADMIN — PROPOFOL 50 MG: 10 INJECTION, EMULSION INTRAVENOUS at 11:41

## 2025-05-08 RX ADMIN — PROPOFOL 150 MCG/KG/MIN: 10 INJECTION, EMULSION INTRAVENOUS at 11:40

## 2025-05-08 RX ADMIN — PROPOFOL 50 MG: 10 INJECTION, EMULSION INTRAVENOUS at 11:45

## 2025-05-08 RX ADMIN — LIDOCAINE HYDROCHLORIDE 5 ML: 10 INJECTION, SOLUTION INFILTRATION; PERINEURAL at 11:40

## 2025-05-08 RX ADMIN — PROPOFOL 50 MG: 10 INJECTION, EMULSION INTRAVENOUS at 11:48

## 2025-05-08 RX ADMIN — DEXMEDETOMIDINE HYDROCHLORIDE 16 MCG: 100 INJECTION, SOLUTION INTRAVENOUS at 11:44

## 2025-05-08 RX ADMIN — SODIUM CHLORIDE, SODIUM LACTATE, POTASSIUM CHLORIDE, AND CALCIUM CHLORIDE: .6; .31; .03; .02 INJECTION, SOLUTION INTRAVENOUS at 11:26

## 2025-05-08 ASSESSMENT — ACTIVITIES OF DAILY LIVING (ADL)
ADLS_ACUITY_SCORE: 41
ADLS_ACUITY_SCORE: 41

## 2025-05-08 NOTE — ANESTHESIA CARE TRANSFER NOTE
Patient: Abram Fay    Procedure: Procedure(s):  ESOPHAGOGASTRODUODENOSCOPY WITH DILATION AND BIOPSIES       Diagnosis: Eosinophilic esophagitis [K20.0]  Diagnosis Additional Information: No value filed.    Anesthesia Type:   MAC     Note:    Oropharynx: oropharynx clear of all foreign objects  Level of Consciousness: awake  Oxygen Supplementation: face mask  Level of Supplemental Oxygen (L/min / FiO2): 8  Independent Airway: airway patency satisfactory and stable    Vital Signs Stable: post-procedure vital signs reviewed and stable  Report to RN Given: handoff report given  Patient transferred to: PACU  Comments: Patient came to PACU with oral airway in place due to obstructing airway. Airway removed approximately 1 minute after arrival. Awake and talking a present  Handoff Report: Identifed the Patient, Identified the Reponsible Provider, Reviewed the pertinent medical history, Discussed the surgical course, Reviewed Intra-OP anesthesia mangement and issues during anesthesia, Set expectations for post-procedure period and Allowed opportunity for questions and acknowledgement of understanding      Vitals:  Vitals Value Taken Time   /61 05/08/25 12:03   Temp     Pulse 95 05/08/25 12:06   Resp 15 05/08/25 12:06   SpO2 96 % 05/08/25 12:06   Vitals shown include unfiled device data.    Electronically Signed By: TAMY SHARPE CRNA  May 8, 2025  12:07 PM

## 2025-05-08 NOTE — INTERVAL H&P NOTE
I have reviewed the surgical (or preoperative) H&P that is linked to this encounter, and examined the patient. There are no significant changes    Clinical Conditions Present on Arrival:  Clinically Significant Risk Factors Present on Admission

## 2025-05-08 NOTE — PRE-PROCEDURE
Pre-procedure Note    Reason for procedure: EoE, Celiac disease    History and Physical Reviewed: Reviewed, no changes.    Pre-sedation assessment:    General: alert, appears stated age, and cooperative  Airway: normal  Heart: regular rate and rhythm  Lungs: clear to auscultation bilaterally    Sedation Plan based on assessment: Moderate    Mallampati score: Class II (visualization of the soft palate, fauces, and uvula)          ASA Classification: ASA 3 - Patient with moderate systemic disease with functional limitations    Impression: Patient deemed adequate candidate for sedation    Risks, benefits and alternatives were discussed with the patient and informed consent was obtained.    Plan: esophagogastroduodenoscopy                                                    Radha Asencio M.D.  Thank you for the opportunity to participate in the care of this patient.   Please feel free to call me with any questions or concerns.  Phone number [phone].

## 2025-05-08 NOTE — ANESTHESIA POSTPROCEDURE EVALUATION
Patient: Abram Fay    Procedure: Procedure(s):  ESOPHAGOGASTRODUODENOSCOPY WITH DILATION AND BIOPSIES       Anesthesia Type:  MAC    Note:     Postop Pain Control: Uneventful            Sign Out: Well controlled pain   PONV: No   Neuro/Psych: Uneventful            Sign Out: Acceptable/Baseline neuro status   Airway/Respiratory: Uneventful            Sign Out: Acceptable/Baseline resp. status   CV/Hemodynamics: Uneventful            Sign Out: Acceptable CV status; No obvious hypovolemia; No obvious fluid overload   Other NRE: NONE   DID A NON-ROUTINE EVENT OCCUR? No           Last vitals:  Vitals Value Taken Time   /53 05/08/25 12:10   Temp     Pulse 94 05/08/25 12:10   Resp 14 05/08/25 12:10   SpO2 94 % 05/08/25 12:10       Electronically Signed By: Evangelist Gandara MD  May 8, 2025  2:27 PM

## 2025-05-08 NOTE — PROGRESS NOTES
Pt and family verbalize good understanding of discharge teach and follow up with MD.   VSS,Surgical incision CDI. D/C criteria met. Pt verbalizes readiness to go home.   MD has spoken with patient and mother.  Pt discharged with mother.     @ME2@ 5/8/2025 12:19 PM

## 2025-05-08 NOTE — ANESTHESIA PREPROCEDURE EVALUATION
Anesthesia Pre-Procedure Evaluation    Patient: Abram Fay   MRN: [id number] : 1995          Procedure : Procedure(s):  ESOPHAGOGASTRODUODENOSCOPY         Past Medical History:   Diagnosis Date    Autistic disorder, current or active state     Constipation     Encopresis(307.7)     Muscle tone, decreased       Past Surgical History:   Procedure Laterality Date    CREATE STOMA ANTEGRADE COLONIC ENEMA  2011    Procedure:CREATE STOMA ANTEGRADE COLONIC ENEMA; Replacement of Appendicostomy  Tube; Surgeon:ALIA BREWSTER; Location:[facility] OR    IR CECOSTOMY COLONIC TUBE REPLACEMENT  2024    IR CECOSTOMY COLONIC TUBE REPLACEMENT  2024    PLACEMENT APPENDICOSTOMY      ZZHC CREATE EARDRUM OPENING,GEN ANESTH      3 SETS PE TUBES      Allergies   Allergen Reactions    No Known Allergies       Social History     Tobacco Use    Smoking status: Never    Smokeless tobacco: Never   Substance Use Topics    Alcohol use: No      Wt Readings from Last 1 Encounters:   25 127 kg (280 lb)        Anesthesia Evaluation   Pt has had prior anesthetic.     History of anesthetic complications (respiratory issues during a case at [facility])       ROS/MED HX  ENT/Pulmonary:     (+) sleep apnea,                                       Neurologic: Comment: autism      Cardiovascular:  - neg cardiovascular ROS     METS/Exercise Tolerance:     Hematologic:  - neg hematologic  ROS     Musculoskeletal:       GI/Hepatic: Comment: encopresis      Renal/Genitourinary:  - neg Renal ROS     Endo:     (+)               Obesity (BMI >40),       Psychiatric/Substance Use:       Infectious Disease:       Malignancy:       Other:              Physical Exam  Airway  Mallampati: II  TM distance: >3 FB  Neck ROM: full  Upper bite lip test: III    Cardiovascular   Rhythm: regular  Rate: normal rate     Dental   (+) Minor Abnormalities - some fillings, tiny chips      Pulmonary Breath sounds clear to auscultation        Neurological -  "normal exam  He appears awake, alert and oriented x3.    Other Findings       OUTSIDE LABS:  CBC:   Lab Results   Component Value Date    WBC 7.8 02/17/2025    WBC 9.0 02/05/2008    HGB 12.0 02/05/2008    HGB 12.8 02/28/2007    HCT 37.1 02/05/2008    HCT 39.9 02/28/2007     02/05/2008     02/28/2007     BMP:   Lab Results   Component Value Date     02/05/2008     02/28/2007    POTASSIUM 3.9 02/05/2008    POTASSIUM 4.1 02/28/2007    CHLORIDE 104 02/05/2008    CHLORIDE 106 02/28/2007    CO2 26 02/05/2008    CO2 21 02/28/2007    BUN 19 02/05/2008    BUN 15 02/28/2007    CR 0.51 02/05/2008    CR 0.50 02/28/2007     (H) 02/05/2008    GLC 96 02/28/2007     COAGS: No results found for: \"PTT\", \"INR\", \"FIBR\"  POC: No results found for: \"BGM\", \"HCG\", \"HCGS\"  HEPATIC:   Lab Results   Component Value Date    ALBUMIN 4.6 02/05/2008    PROTTOTAL 7.9 02/05/2008    ALT 29 02/05/2008    AST 29 02/05/2008    GGT 21 02/05/2008    ALKPHOS 169 02/05/2008    BILITOTAL <0.1 (L) 02/05/2008     OTHER:   Lab Results   Component Value Date    SAM 9.7 02/05/2008    MAG 1.9 02/05/2008    TSH 1.27 02/05/2008    T4 0.82 02/05/2008       Anesthesia Plan    ASA Status:  3             Techniques and Equipment:       - Monitoring Plan: standard ASA monitoring.     Consents    Anesthesia Plan(s) and associated risks, benefits, and realistic alternatives discussed. Questions answered and patient/representative(s) expressed understanding.     - Discussed: anesthesiologist     - Discussed with:  Patient            Postoperative Care            Comments:                   Evangelist Gandara MD    I have reviewed the pertinent notes and labs in the chart from the past 30 days and (re)examined the patient.  Any updates or changes from those notes are reflected in this note.    Clinically Significant Risk Factors Present on Admission                                              "

## 2025-05-09 LAB
PATH REPORT.COMMENTS IMP SPEC: NORMAL
PATH REPORT.FINAL DX SPEC: NORMAL
PATH REPORT.GROSS SPEC: NORMAL
PATH REPORT.MICROSCOPIC SPEC OTHER STN: NORMAL
PATH REPORT.RELEVANT HX SPEC: NORMAL
PHOTO IMAGE: NORMAL

## 2025-05-09 PROCEDURE — 88305 TISSUE EXAM BY PATHOLOGIST: CPT | Mod: 26 | Performed by: PATHOLOGY

## 2025-06-12 ENCOUNTER — TRANSFERRED RECORDS (OUTPATIENT)
Dept: ADMINISTRATIVE | Facility: CLINIC | Age: 30
End: 2025-06-12
Payer: COMMERCIAL

## 2025-06-13 NOTE — PROGRESS NOTES
_________________________________________________________________________________________________    Patient name: Abram Fay II  YOB: 1995  Encounter date: 06/12/2025 02:45 PM  Current date: 06/12/2025 03:40 PM  Procedure: Infusion      Infusion/Additional Medication Orders    Assessment: Iron deficiency anemia, unspecified D50.9     Medication grids  Order Date Medication Dose Route Rate Rate 2 Rate 3 Rate 4 Int. of Treat.   04/21/2025 Venofer 200mgiron/10ml  mL/hr    5 does within 14 days   Inf. Date Medication % Conc. Inf. # Therapy Type Bag # Vials Total mgs Lot # Exp. Date   06/12/2025 Venofer  1 1/5  1 200.00 38768 02/28/2027   Inf. Date Medication IV Site IV Gauge Start Stop Total Time Wt. (lbs) Wt. (kgs)   06/12/2025 Venofer left hand 24 250  PM 0 hour(s) 0 minute(s) 311.40 141.224     Vitals Flowsheet  Time Temp Pulse Resp Sys BP Rhoda BP Reaction Conc Rate   2:31 PM 96.8 89 12 117 80      3:05 PM 96.9 78 12 116 73      3:35 PM 98.1 80 16 117 70        Post Infusion  The patient did tolerate the infusion.     Nursing Notes  Order date: 04.21.2025 first Venofer education gone over with pt, questions answered  Last infusion date: NA  Oral premeds per order: NA  Specialty Pharmacy: N  Change in health status per assessment? N  IV maintenance 0.9% NS 500ml TKO  Start time: 245PM  IV start by: Katy Gerard RN, 1st failed attempt in left AC by Alessia ESPARZA RN  IV and Fluid D/C time: 335PM  NS volume infused: <50mL  Site condition: CDI and patent throughout infusion, no redness/swelling at IV site  D/C instructions reviewed, Pt verbalized understanding.  Alessia Brownlee RN    Date Medication Total mg Used mg Wasted mg   06/12/2025 Venofer 200.00 200.00 0.00       Visit oversight provided by:  Landon Zuluaga MD 06/12/2025 02:45 PM

## 2025-06-19 ENCOUNTER — TRANSFERRED RECORDS (OUTPATIENT)
Dept: GASTROENTEROLOGY | Facility: CLINIC | Age: 30
End: 2025-06-19
Payer: COMMERCIAL

## 2025-06-20 NOTE — PROGRESS NOTES
_________________________________________________________________________________________________    Patient name: Abram Fay II  YOB: 1995  Encounter date: 06/19/2025 02:45 PM  Current date: 06/19/2025 03:28 PM  Procedure: Infusion      Infusion/Additional Medication Orders    Assessment: Iron deficiency anemia, unspecified D50.9     Medication grids  Order Date Medication Dose Route Rate Rate 2 Rate 3 Rate 4 Int. of Treat.   04/21/2025 Venofer 200mgiron/10ml  mL/hr    5 doses over 14 days   Inf. Date Medication % Conc. Inf. # Therapy Type Bag # Vials Total mgs Lot # Exp. Date   06/19/2025 Venofer  2   1 200.00 [id number] 02/28/2027   Inf. Date Medication IV Site IV Gauge Start Stop Total Time Wt. (lbs) Wt. (kgs)   06/19/2025 Venofer left hand 24 2:40 PM 2:55 PM 0 hour(s) 0 minute(s) 311.00 141.043     Vitals Flowsheet  Time Temp Pulse Resp Sys BP Rhoda BP Reaction Conc Rate   2:35 PM 97.8 86 12 133 84      2:55PM 97.8 81 12 120 71      3:25 PM 98.3 84 12 116 76        Post Infusion  The patient did tolerate the infusion.     Nursing Notes  Order date: 04/21/2025  Last infusion date: 06/12/2025  Oral premeds per order: N/A  Specialty Pharmacy: N  Change in health status per assessment? N  IV maintenance 0.9% NS 500ml TKO  Start time: 2:35 PM  IV start by: Jessenia Caceres RN  IV and Fluid D/C time: 3:25 PM  NS volume infused: <50mL  Site condition: CDI and patent throughout infusion, no redness/swelling at IV site  D/C instructions reviewed, Pt verbalized understanding.  Jessenia Caceres RN    Date Medication Total mg Used mg Wasted mg   06/19/2025 Venofer 200.00 200.00 0.00   06/12/2025 Venofer 200.00 200.00 0.00       Visit oversight provided by:  Heidi Joyce MD 06/19/2025 02:45 PM

## 2025-06-24 DIAGNOSIS — K94.19 ALTERED BOWEL ELIMINATION DUE TO INTESTINAL OSTOMY (H): Primary | ICD-10-CM

## 2025-06-24 NOTE — PROGRESS NOTES
D: Ramses's mother called today to report that his appendicostomy tube had fallen out. Order placed for IR referral for tube replacement. Ramses is doing well. Still using the tube to administer antegrade enemas. Taking oral senna daily. Will also place referrals today for adult colorectal surgery of his appendicostomy and constipation management.   A: needs tube replaced as he is still using it.   P:IR referral as well as referral to adult providers.

## 2025-06-25 ENCOUNTER — PATIENT OUTREACH (OUTPATIENT)
Dept: CARE COORDINATION | Facility: CLINIC | Age: 30
End: 2025-06-25
Payer: COMMERCIAL

## 2025-06-26 ENCOUNTER — APPOINTMENT (OUTPATIENT)
Dept: INTERVENTIONAL RADIOLOGY/VASCULAR | Facility: CLINIC | Age: 30
End: 2025-06-26
Attending: NURSE PRACTITIONER
Payer: COMMERCIAL

## 2025-06-26 ENCOUNTER — HOSPITAL ENCOUNTER (OUTPATIENT)
Facility: CLINIC | Age: 30
Discharge: HOME | End: 2025-06-26
Attending: STUDENT IN AN ORGANIZED HEALTH CARE EDUCATION/TRAINING PROGRAM | Admitting: PHYSICIAN ASSISTANT
Payer: COMMERCIAL

## 2025-06-26 DIAGNOSIS — K94.19 ALTERED BOWEL ELIMINATION DUE TO INTESTINAL OSTOMY (H): ICD-10-CM

## 2025-06-26 PROCEDURE — 49450 REPLACE G/C TUBE PERC: CPT | Performed by: PHYSICIAN ASSISTANT

## 2025-06-26 PROCEDURE — C1887 CATHETER, GUIDING: HCPCS

## 2025-06-26 PROCEDURE — C1769 GUIDE WIRE: HCPCS

## 2025-06-26 PROCEDURE — 49450 REPLACE G/C TUBE PERC: CPT

## 2025-06-26 PROCEDURE — 250N000011 HC RX IP 250 OP 636: Performed by: PHYSICIAN ASSISTANT

## 2025-06-26 RX ORDER — LIDOCAINE HYDROCHLORIDE 20 MG/ML
JELLY TOPICAL ONCE
Status: DISCONTINUED | OUTPATIENT
Start: 2025-06-26 | End: 2025-07-01 | Stop reason: HOSPADM

## 2025-06-26 RX ORDER — IOPAMIDOL 612 MG/ML
1-15 INJECTION, SOLUTION INTRATHECAL ONCE
Status: COMPLETED | OUTPATIENT
Start: 2025-06-26 | End: 2025-06-26

## 2025-06-26 RX ADMIN — IOPAMIDOL 5 ML: 612 INJECTION, SOLUTION INTRATHECAL at 13:04

## 2025-06-26 ASSESSMENT — ACTIVITIES OF DAILY LIVING (ADL)
ADLS_ACUITY_SCORE: 41

## 2025-06-26 NOTE — PROCEDURES
[facility]    Procedure: IR Procedure Note    Date/Time: 6/26/2025 1:14 PM    Performed by: Cj Reza PA-C  Authorized by: Cj Reza PA-C  IR Fellow Physician:  Other(s) attending procedure: Assist: Lola Turner, MS-4    Pre Procedure Diagnosis: Chronic constipation  Post Procedure Diagnosis: Same    UNIVERSAL PROTOCOL   Site Marked: NA  Prior Images Obtained and Reviewed:  Yes  Required items: Required blood products, implants, devices and special equipment available    Patient identity confirmed:  Hospital-assigned identification number  NA - No sedation, light sedation, or local anesthesia  Confirmation Checklist:  Procedure was appropriate and matched the consent or emergent situation  Time out: Immediately prior to the procedure a time out was called    Universal Protocol: the Joint Commission Universal Protocol was followed    Preparation: Patient was prepped and draped in usual sterile fashion    ESBL (mL):  0.1     ANESTHESIA    Local Anesthetic:  Topical anesthetic  Anesthetic Total (mL):  4      SEDATION    Patient Sedated: No    Findings: Fluoroscopy guided replacement of prior 10 Fr., 9.0 cm stoma length umbilical cecostomy tube with new 10 Fr., 10.0 cm stoma length umbilical cecostomy tube. Challenging due to length of stoma, adipose tissue, and stenosed tract. Over the wire dilatation with 6, 8, and 12 Fr., dilators performed.    Specimens: none    Procedural Complications: None    Condition: Stable    Plan: Follow up per surgery/primary team. Resume prior use and cares.       PROCEDURE    Length of time physician/provider present for 1:1 monitoring during sedation:  0 min

## 2025-06-27 ASSESSMENT — ACTIVITIES OF DAILY LIVING (ADL)
ADLS_ACUITY_SCORE: 41

## 2025-06-28 ASSESSMENT — ACTIVITIES OF DAILY LIVING (ADL)
ADLS_ACUITY_SCORE: 41

## 2025-06-29 ASSESSMENT — ACTIVITIES OF DAILY LIVING (ADL)
ADLS_ACUITY_SCORE: 41

## 2025-06-30 ASSESSMENT — ACTIVITIES OF DAILY LIVING (ADL)
ADLS_ACUITY_SCORE: 41

## 2025-07-03 ENCOUNTER — TRANSFERRED RECORDS (OUTPATIENT)
Dept: ADMINISTRATIVE | Facility: CLINIC | Age: 30
End: 2025-07-03
Payer: COMMERCIAL

## 2025-07-04 NOTE — PROGRESS NOTES
_________________________________________________________________________________________________    Patient name: Abram Fay II  YOB: 1995  Encounter date: 07/03/2025 02:15 PM  Current date: 07/03/2025 03:39 PM  Procedure: Infusion      Infusion/Additional Medication Orders    Assessment: Iron deficiency anemia, unspecified D50.9     Medication grids  Order Date Medication Dose Route Rate Rate 2 Rate 3 Rate 4 Int. of Treat.   04/21/2025 Venofer 200mgiron/10ml  mL/hr    5 doses over 14 day   Inf. Date Medication % Conc. Inf. # Therapy Type Bag # Vials Total mgs Lot # Exp. Date   07/03/2025 Venofer  3 maintenance  1 200.00 88592 02/29/2027   Inf. Date Medication IV Site IV Gauge Start Stop Total Time Wt. (lbs) Wt. (kgs)   07/03/2025 Venofer left antecubital 22 2:35 PM 3:50 PM  317.00 143.764     Vitals Flowsheet  Time Temp Pulse Resp Sys BP Rhoda BP Reaction Conc Rate   2:05 PM 97.1 97 18 148 77      2:50 PM 97.7 89 16 128 56      3:20 PM 97.1 77 16 112 59        Post Infusion  The patient did tolerate the infusion.     Nursing Notes  Order date: 4/21/25  Last infusion date: 6/26/26  Oral premeds per order: N/A  Specialty Pharmacy: N  Change in health status per assessment? y- pt reported HA following last 2 infusions. Denied other symptoms. HA resolved on their own. Dr. Luu consulted ok to infuse and advised to call if symptoms occur again.   IV maintenance 0.9% NS 500ml TKO  Start time: 1430  IV start by: BASIM Earl, RN  IV and Fluid D/C time: 1520  NS volume infused: <50mL  Site condition: CDI and patent throughout infusion, no redness/swelling at IV site  D/C instructions reviewed, Pt verbalized understanding.  Vanesa Rm, RN    Date Medication Total mg Used mg Wasted mg   07/03/2025 Venofer 200.00 200.00 0.00   06/19/2025 Venofer 200.00 200.00 0.00   06/12/2025 Venofer 200.00 200.00 0.00       Visit oversight provided by:  Bienvenido Luu DO 07/03/2025 02:15 PM

## 2025-07-08 ENCOUNTER — TELEPHONE (OUTPATIENT)
Dept: INTERNAL MEDICINE | Facility: CLINIC | Age: 30
End: 2025-07-08
Payer: COMMERCIAL

## 2025-07-08 ENCOUNTER — TELEPHONE (OUTPATIENT)
Dept: SURGERY | Facility: CLINIC | Age: 30
End: 2025-07-08
Payer: COMMERCIAL

## 2025-07-08 DIAGNOSIS — Z98.890: Primary | ICD-10-CM

## 2025-07-08 DIAGNOSIS — K94.19 ALTERED BOWEL ELIMINATION DUE TO INTESTINAL OSTOMY (H): Primary | ICD-10-CM

## 2025-07-08 NOTE — TELEPHONE ENCOUNTER
General Call      Reason for Call:  call back    What are your questions or concerns:  patient has a medical device that fell out and needs order to be replaced. [employer] is able to do this for patient. Phone number for the department is [phone], mom was unable to get fax number on where to send order.     Date of last appointment with provider: n/a    Could we send this information to you in [place] [place] or would you prefer to receive a phone call?:   No preference   Okay to leave a detailed message?: Yes at Other phone number: [phone]

## 2025-07-10 ENCOUNTER — TRANSFERRED RECORDS (OUTPATIENT)
Dept: ADMINISTRATIVE | Facility: CLINIC | Age: 30
End: 2025-07-10
Payer: COMMERCIAL

## 2025-07-11 NOTE — PROGRESS NOTES
_________________________________________________________________________________________________    Patient name: Abram Fay II  YOB: 1995  Encounter date: 07/10/2025 02:45 PM  Current date: 07/10/2025 03:39 PM  Procedure: Infusion      Infusion/Additional Medication Orders    Assessment: Iron deficiency anemia, unspecified D50.9     Medication grids  Order Date Medication Dose Route Rate Rate 2 Rate 3 Rate 4 Int. of Treat.   04/21/2025 Venofer 200mgiron/10ml  mL/hr    5 doses over 14 days   Inf. Date Medication % Conc. Inf. # Therapy Type Bag # Vials Total mgs Lot # Exp. Date   07/10/2025 Venofer  4 Dose 4  1 200.00 12106 02/28/2027   Inf. Date Medication IV Site IV Gauge Start Stop Total Time Wt. (lbs) Wt. (kgs)   07/10/2025 Venofer left forearm 24 2:45 PM 3:00 PM 0 hour(s) 0 minute(s) 313.40 142.132     Vitals Flowsheet  Time Temp Pulse Resp Sys BP Rhoda BP Reaction Conc Rate   2:35 PM 96.5 85 12 135 75      3:00 PM 97.9 84 12 132 89      3:30 PM 98.0 77 16 125 57        Post Infusion  The patient did tolerate the infusion.     Nursing Notes  Order date: 04/21/25  Last infusion date: 07/03/25  Oral premeds per order: N/A  Specialty Pharmacy: N  Change in health status per assessment? N  IV maintenance 0.9% NS 500ml TKO  Start time: 0240pm  IV start by: Vilma Edouard RN  IV and Fluid D/C time: 315pm  NS volume infused: <50mL  Site condition: CDI and patent throughout infusion, no redness/swelling at IV site  D/C instructions reviewed, Pt verbalized understanding.  Vilma Edouard RN    Date Medication Total mg Used mg Wasted mg   07/10/2025 Venofer 200.00 200.00 0.00   07/03/2025 Venofer 200.00 200.00 0.00   06/19/2025 Venofer 200.00 200.00 0.00   06/12/2025 Venofer 200.00 200.00 0.00       Visit oversight provided by:  Gene Vallejo MD 07/10/2025 02:45 PM

## 2025-07-12 ENCOUNTER — HEALTH MAINTENANCE LETTER (OUTPATIENT)
Age: 30
End: 2025-07-12

## 2025-07-14 ENCOUNTER — APPOINTMENT (OUTPATIENT)
Dept: INTERVENTIONAL RADIOLOGY/VASCULAR | Facility: CLINIC | Age: 30
End: 2025-07-14
Attending: PHYSICIAN ASSISTANT
Payer: COMMERCIAL

## 2025-07-14 ENCOUNTER — TELEPHONE (OUTPATIENT)
Dept: SURGERY | Facility: CLINIC | Age: 30
End: 2025-07-14

## 2025-07-14 ENCOUNTER — HOSPITAL ENCOUNTER (OUTPATIENT)
Facility: CLINIC | Age: 30
Discharge: HOME | End: 2025-07-14
Attending: STUDENT IN AN ORGANIZED HEALTH CARE EDUCATION/TRAINING PROGRAM | Admitting: PHYSICIAN ASSISTANT
Payer: COMMERCIAL

## 2025-07-14 DIAGNOSIS — K59.00 CONSTIPATION, UNSPECIFIED CONSTIPATION TYPE: ICD-10-CM

## 2025-07-14 PROCEDURE — C1769 GUIDE WIRE: HCPCS

## 2025-07-14 PROCEDURE — 49450 REPLACE G/C TUBE PERC: CPT | Mod: 74

## 2025-07-14 PROCEDURE — 255N000002 HC RX 255 OP 636: Performed by: PHYSICIAN ASSISTANT

## 2025-07-14 PROCEDURE — C1887 CATHETER, GUIDING: HCPCS

## 2025-07-14 PROCEDURE — 49450 REPLACE G/C TUBE PERC: CPT | Mod: 53 | Performed by: PHYSICIAN ASSISTANT

## 2025-07-14 RX ORDER — IODIXANOL 320 MG/ML
50 INJECTION, SOLUTION INTRAVASCULAR ONCE
Status: COMPLETED | OUTPATIENT
Start: 2025-07-14 | End: 2025-07-14

## 2025-07-14 RX ADMIN — IODIXANOL 5 ML: 320 INJECTION, SOLUTION INTRAVASCULAR at 08:57

## 2025-07-14 ASSESSMENT — ACTIVITIES OF DAILY LIVING (ADL)
ADLS_ACUITY_SCORE: 41

## 2025-07-14 NOTE — TELEPHONE ENCOUNTER
D: I spoke with Ramses's mom today after IR was unable to replace appendicostomy tube as tract had closed and they were unable to get a wire across. Ramses has an appointment with [facility] in [place] in 2 weeks. Originally this appointment was to possibly transfer to adult surgical maintenance of his appendicostomy tube and antegrade enemas. I asked mom to keep the appointment because they have a comprehensive clinic that includes pelvic floor therapy.   I reminded mom that there had been many times in the past when Ramses had been nearly weaned off of the daily antegrade enema with the use of laxatives and that with comprehensive care he is capable of achieving bowel management.   I recommended that Ramses continue to take 2 capfuls of Miralax daily and 2 ExLax squares ( or 10 mg of bisacodyl tablets) at bedtime. I recommended toilet sits after meals . He can do one Fleets enema daily as needed.   A: appendicostomy tract not viable   P: keep appointment at [facility] as they have a comprehensive clinic.      Pt did not show Yesterday and when called, pt's mother stated that his car is not working and he will need to call back to schedule.

## 2025-07-14 NOTE — PROGRESS NOTES
Patient Name: Abram Fay  Medical Record Number: [id number]  Today's Date: 7/14/2025    Procedure: Cocostomy tube exchange  Proceduralist: Libia ROWE  Pathology present: N/A    Procedure Start: 0836  Procedure end: 0856  Sedation medications administered: None     Report given to: N/A  : N/A    Other Notes: Pt arrived to IR room 2 from [facility]. Consent reviewed. Pt denies any questions or concerns regarding procedure. Pt positioned supine and monitored per protocol. Pt tolerated procedure without any noted complications. We were unable to replace tube as track was closed.  Pt transferred back to gold waiting.

## 2025-07-14 NOTE — PROCEDURES
[facility]    Procedure: IR Procedure Note    Date/Time: 7/14/2025 8:58 AM    Performed by: Rosalina Mae PA-C  Authorized by: Rosalina Mae PA-C  IR Fellow Physician:  Other(s) attending procedure: Randy Reza PA-C    Pre Procedure Diagnosis: Cecostomy tube in place  Post Procedure Diagnosis: Same    UNIVERSAL PROTOCOL   Site Marked: NA  Prior Images Obtained and Reviewed:  Yes  Required items: Required blood products, implants, devices and special equipment available    Patient identity confirmed:  Arm band, provided demographic data, hospital-assigned identification number and verbally with patient  Patient was reevaluated immediately before administering moderate or deep sedation or anesthesia  Confirmation Checklist:  Correct equipment/implants were available, procedure was appropriate and matched the consent or emergent situation, relevant allergies and patient's identity using two indicators  Time out: Immediately prior to the procedure a time out was called    Universal Protocol: the Joint Commission Universal Protocol was followed    Preparation: Patient was prepped and draped in usual sterile fashion       ANESTHESIA    Anesthesia:  Local infiltration  Local Anesthetic:  Topical anesthetic      SEDATION    Patient Sedated: No    See dictated procedure note for full details.  Findings: Closed cecostomy tube track.     Specimens: none    Procedural Complications: None    Condition: Stable    Plan: Patient to follow up with surgical team for evaulation.       PROCEDURE  Describe Procedure: Closed cecostomy tube track. Patient to follow up with surgical team.   Patient Tolerance:  Patient tolerated the procedure well with no immediate complications  Length of time physician/provider present for 1:1 monitoring during sedation:  0 min

## 2025-07-15 ASSESSMENT — ACTIVITIES OF DAILY LIVING (ADL)
ADLS_ACUITY_SCORE: 41

## 2025-07-16 ASSESSMENT — ACTIVITIES OF DAILY LIVING (ADL)
ADLS_ACUITY_SCORE: 41

## 2025-07-17 ASSESSMENT — ACTIVITIES OF DAILY LIVING (ADL)
ADLS_ACUITY_SCORE: 41

## 2025-08-07 ENCOUNTER — TRANSFERRED RECORDS (OUTPATIENT)
Dept: ADMINISTRATIVE | Facility: CLINIC | Age: 30
End: 2025-08-07
Payer: COMMERCIAL

## (undated) DEVICE — GUIDEWIRE ENDOSCOPIC CLEANGUIDE POLY 4 X80CM ESOPHA DIS150

## (undated) DEVICE — SOL WATER IRRIG 1000ML BOTTLE 2F7114

## (undated) DEVICE — SUCTION MANIFOLD NEPTUNE 2 SYS 1 PORT 702-025-000

## (undated) DEVICE — TUBING SUCTION MEDI-VAC 1/4"X20' N620A

## (undated) DEVICE — FORCEP BIOPSY 2.3MM DISP COATED 000388

## (undated) RX ORDER — LIDOCAINE HYDROCHLORIDE 20 MG/ML
JELLY TOPICAL
Status: DISPENSED
Start: 2025-07-14

## (undated) RX ORDER — LIDOCAINE HYDROCHLORIDE 20 MG/ML
JELLY TOPICAL
Status: DISPENSED
Start: 2024-09-05

## (undated) RX ORDER — LIDOCAINE HYDROCHLORIDE 20 MG/ML
JELLY TOPICAL
Status: DISPENSED
Start: 2025-06-26

## (undated) RX ORDER — LIDOCAINE HYDROCHLORIDE 20 MG/ML
JELLY TOPICAL
Status: DISPENSED
Start: 2024-04-18